# Patient Record
Sex: MALE | Race: BLACK OR AFRICAN AMERICAN | NOT HISPANIC OR LATINO | Employment: STUDENT | URBAN - METROPOLITAN AREA
[De-identification: names, ages, dates, MRNs, and addresses within clinical notes are randomized per-mention and may not be internally consistent; named-entity substitution may affect disease eponyms.]

---
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
## 2019-05-01 ENCOUNTER — OFFICE VISIT (OUTPATIENT)
Dept: CARDIOLOGY | Facility: CLINIC | Age: 19
End: 2019-05-01
Payer: MEDICAID

## 2019-05-01 VITALS
DIASTOLIC BLOOD PRESSURE: 80 MMHG | OXYGEN SATURATION: 98 % | WEIGHT: 169 LBS | HEART RATE: 63 BPM | SYSTOLIC BLOOD PRESSURE: 126 MMHG | HEIGHT: 74 IN | BODY MASS INDEX: 21.69 KG/M2

## 2019-05-01 DIAGNOSIS — Z98.890 PERSONAL HISTORY OF SURGERY TO HEART AND GREAT VESSELS, PRESENTING HAZARDS TO HEALTH: ICD-10-CM

## 2019-05-01 DIAGNOSIS — J45.40 MODERATE PERSISTENT ASTHMA WITHOUT COMPLICATION: ICD-10-CM

## 2019-05-01 DIAGNOSIS — Q24.5 ANOMALOUS ORIGIN OF RIGHT CORONARY ARTERY: Primary | ICD-10-CM

## 2019-05-01 PROCEDURE — 99999 PR PBB SHADOW E&M-EST. PATIENT-LVL III: ICD-10-PCS | Mod: PBBFAC,,, | Performed by: INTERNAL MEDICINE

## 2019-05-01 PROCEDURE — 99213 OFFICE O/P EST LOW 20 MIN: CPT | Mod: PBBFAC,PN | Performed by: INTERNAL MEDICINE

## 2019-05-01 PROCEDURE — 99999 PR PBB SHADOW E&M-EST. PATIENT-LVL III: CPT | Mod: PBBFAC,,, | Performed by: INTERNAL MEDICINE

## 2019-05-01 PROCEDURE — 99203 OFFICE O/P NEW LOW 30 MIN: CPT | Mod: S$PBB,,, | Performed by: INTERNAL MEDICINE

## 2019-05-01 PROCEDURE — 99203 PR OFFICE/OUTPT VISIT, NEW, LEVL III, 30-44 MIN: ICD-10-PCS | Mod: S$PBB,,, | Performed by: INTERNAL MEDICINE

## 2019-05-01 NOTE — PROGRESS NOTES
[facility]      Chief Complaint   Patient presents with    [facility]       Patient ID: Savage Baez Jr. is a 18 y.o. male with a past medical history of anomalous right coronary artery arising from left sinus s/p RCA unroofing and then re-connection of the repaired vessel to the aorta on 12/19/2017, asthma, who presents for an initial appointment.  Pertinent history/events are as follows:     -Pt followed by Dr. Sage of Pediatric Cardiology.    HPI:  Ms. Baez reports no chest pain, SOB, LE edema, palpitations, TIA symptoms or syncope.  Echo from 1/2018 as below.  EKG from 1/8/2018 shows normal sinus rhythm; right atrial enlargement; nonspecific T wave abnormality.    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    Asthma      Past Surgical History:   Procedure Laterality Date    anomalous coronary artery repair Right 12/19/2017    REPAIR-CORONARY ARTERY ANOMALIES N/A 12/19/2017    Performed by Jalen Masetrson MD at [facility] OR [address] [place] [address]     Social History     Socioeconomic History    Marital status: Single     Spouse name: Not on file    Number of children: Not on file    Years of education: Not on file    Highest education level: Not on file   Occupational History    Not on file   Social Needs    Financial resource strain: Not on file    Food insecurity:     Worry: Not on file     Inability: Not on file    Transportation needs:     Medical: Not on file     Non-medical: Not on file   Tobacco Use    Smoking status: Never Smoker    Smokeless tobacco: Never Used   Substance and Sexual Activity    Alcohol use: Not on file    Drug use: Not on file    Sexual activity: Not on file   Lifestyle    Physical activity:     Days per week: Not on file     Minutes per session: Not on file    Stress: Not on file   Relationships    Social connections:     Talks on phone: Not on file      Gets together: Not on file     Attends Scientologist service: Not on file     Active member of club or organization: Not on file     Attends meetings of clubs or organizations: Not on file     Relationship status: Not on file   Other Topics Concern    Not on file   Social History Narrative    Lives with mother and twin sister. 2 dogs, ( 1 inside/1 outside).  In 12 th grade [email or web address]     Family History   Problem Relation Age of Onset    No Known Problems Mother     No Known Problems Father     No Known Problems Sister     No Known Problems Brother     Asthma Sister     No Known Problems Brother        Review of patient's allergies indicates:  No Known Allergies    Medication List with Changes/Refills   Current Medications    ALBUTEROL (ACCUNEB) 1.25 MG/3 ML NEBU    Take 1.25 mg by nebulization every 6 (six) hours as needed. Rescue    BECLOMETHASONE (QVAR) 80 MCG/ACTUATION AERO    Inhale 1 puff into the lungs 2 (two) times daily. Controller    LORATADINE (CLARITIN) 10 MG TABLET    Take 10 mg by mouth once daily.   Discontinued Medications    CLOPIDOGREL (PLAVIX) 75 MG TABLET    Take 1 tablet (75 mg total) by mouth once daily.    FUROSEMIDE (LASIX) 20 MG TABLET    Take 1 tablet (20 mg total) by mouth every 12 (twelve) hours.    IBUPROFEN (ADVIL,MOTRIN) 800 MG TABLET    Take 1 tablet (800 mg total) by mouth every 6 (six) hours as needed for Pain.    NAPROXEN (NAPROSYN) 500 MG TABLET    Take 1 tablet (500 mg total) by mouth every 12 (twelve) hours as needed (pain).    OXYCODONE (ROXICODONE) 10 MG TAB IMMEDIATE RELEASE TABLET    Take 1 tablet (10 mg total) by mouth every 8 (eight) hours as needed.    POLYETHYLENE GLYCOL (GLYCOLAX) 17 GRAM PWPK    Take 17 g by mouth once daily.       Review of Systems  Constitution: Denies chills, fever, and sweats.  HENT: Denies headaches or blurry vision.  Cardiovascular: Denies chest pain or irregular heart beat.  Respiratory: Denies cough or shortness of  "breath.  Gastrointestinal: Denies abdominal pain, nausea, or vomiting.  Musculoskeletal: Denies muscle cramps.  Neurological: Denies dizziness or focal weakness.  Psychiatric/Behavioral: Normal mental status.  Hematologic/Lymphatic: Denies bleeding problem or easy bruising/bleeding.  Skin: Denies rash or suspicious lesions    Physical Examination  /80 (BP Location: Left arm, Patient Position: Sitting, BP Method: Medium (Automatic))   Pulse 63   Ht 6' 2" (1.88 m)   Wt 76.7 kg (169 lb)   SpO2 98%   BMI 21.70 kg/m²     Constitutional: No acute distress, conversant  HEENT: Sclera anicteric, Pupils equal, round and reactive to light, extraocular motions intact, Oropharynx clear  Neck: No JVD, no carotid bruits  Cardiovascular: regular rate and rhythm, no murmur, rubs or gallops, normal S1/S2  Pulmonary: Clear to auscultation bilaterally  Abdominal: Abdomen soft, nontender, nondistended, positive bowel sounds  Extremities: No lower extremity edema,   Pulses:  Carotid pulses are 2+ on the right side, and 2+ on the left side.  Radial pulses are 2+ on the right side, and 2+ on the left side.   Femoral pulses are 2+ on the right side, and 2+ on the left side.  Popliteal pulses are 2+ on the right side, and 2+ on the left side.   Dorsalis pedis pulses are 2+ on the right side, and 2+ on the left side.   Posterior tibial pulses are 2+ on the right side, and 2+ on the left side.    Skin: No ecchymosis, erythema, or ulcers  Psych: Alert and oriented x 3, appropriate affect  Neuro: CNII-XII intact, no focal deficits    Labs:  Most Recent Data  CBC:   Lab Results   Component Value Date    WBC 7.50 01/08/2018    HGB 13.7 (L) 01/08/2018    HCT 40.0 01/08/2018     (H) 01/08/2018    MCV 82 01/08/2018    RDW 12.2 01/08/2018     BMP:   Lab Results   Component Value Date     12/22/2017    K 3.8 12/22/2017     12/22/2017    CO2 30 (H) 12/22/2017    BUN 10 12/22/2017    CREATININE 1.0 12/22/2017    GLU 91 " 12/22/2017    CALCIUM 9.1 12/22/2017    MG 1.9 12/22/2017    PHOS 3.3 12/22/2017     LFTS;   Lab Results   Component Value Date    PROT 6.3 12/22/2017    ALBUMIN 3.3 12/22/2017    BILITOT 0.5 12/22/2017    AST 23 12/22/2017    ALKPHOS 89 12/22/2017    ALT 10 12/22/2017     COAGS:   Lab Results   Component Value Date    INR 1.2 12/21/2017     FLP: No results found for: CHOL, HDL, LDLCALC, TRIG, CHOLHDL  CARDIAC: No results found for: TROPONINI, CKMB, BNP      EKG 1/8/2019:  Normal sinus rhythm  Right atrial enlargement  Nonspecific T wave abnormality    Echo 1/2018:  ECHO:  No pericardial effusion. There are 4 cardiac chambers.  No clots or vegetations.  No ASD or VSD.  Cardiac valves appear normal.  No Ao arch obstruction. No RVOT or LVOT obstruction.  M-mode measurements reveal: LV 4.9 cm and RV 2.2 cm. (both are normal). Ao root 3.2 cm.  LA 3.4 cm.  Sep 0.9 cm. PW 1.0 cm.  EF 70%.  The LCA appears to arise normally off the L posterior cusp of the Ao.  It is not enlarged. Normal branching into the LAD and Circ. The surgically constructed RCA to Ao connection appears patent.  Doppler shows mild TR with peak pressure drop of 20-30 mmHg (speaks against PHTN). Mild PI.  Peak pressure drop across the PV is 5 mmHg.  No MR and no AI. Peak pressure drop across the Ao valve is 5 mmHg.  PVs to the LA.      Assessment/Plan:  Savage Baez Jr. is a 18 y.o. male with a past medical history of anomalous right coronary artery arising from left sinus s/p RCA unroofing and then re-connection of the repaired vessel to the aorta on 12/19/2017, asthma, who presents for an initial appointment.    1. 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     Refer to Congenital Clinic    Total duration of face to face visit time 30 minutes.  Total time spent counseling greater than fifty percent of  total visit time.  Counseling included discussion regarding imaging findings, diagnosis, possibilities, treatment options, risks and benefits.  The patient had many questions regarding the options and long-term effects.    Felton Simms MD, PhD  Interventional Cardiology

## 2019-05-01 NOTE — PATIENT INSTRUCTIONS
Assessment/Plan:  Savage NOLASCO Nestor Morales is a 18 y.o. male with a past medical history of anomalous right coronary artery arising from left sinus s/p RCA unroofing and then re-connection of the repaired vessel to the aorta on 12/19/2017, asthma, who presents for an initial appointment.    1. 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     Refer to Congenital Clinic

## 2019-05-01 NOTE — LETTER
May 1, 2019      Tristian Sage MD  [address]  [address]  [facility] [address]           [place] - Cardiology  [address]  [place]   [facility] [address]  Phone: [phone]  Fax: [phone]          Patient: Savage Baez Jr.   MR Number: [id number]   YOB: 2000   Date of Visit: 5/1/2019       Dear Dr. Tristian Sage:    Thank you for referring Savage Baez to me for evaluation. Attached you will find relevant portions of my assessment and plan of care.    If you have questions, please do not hesitate to call me. I look forward to following Savage Baez along with you.    Sincerely,    Felton Simms MD PhD    Enclosure  CC:  No Recipients    If you would like to receive this communication electronically, please contact [email or web address] or [phone] to request more information on [employer] access.    For providers and/or their staff who would like to refer a patient to [facility], please contact us through our one-stop-shop provider referral line, [facility], at [phone].    If you feel you have received this communication in error or would no longer like to receive these types of communications, please e-mail [email or web address]

## 2019-05-06 ENCOUNTER — TELEPHONE (OUTPATIENT)
Dept: CARDIOLOGY | Facility: CLINIC | Age: 19
End: 2019-05-06

## 2019-05-06 NOTE — TELEPHONE ENCOUNTER
Ms Baez came into the office today, inquiring about having medical records and physical to faxed for Mr Baez to cocone.

## 2019-05-06 NOTE — TELEPHONE ENCOUNTER
----- Message from Adam Hoover sent at 5/6/2019  3:24 PM CDT -----  Contact: FRANCISCA- MOTHER  PATIENT WOULD LIKE TO START NEW JOB AND HAS FORMS THAT NEED TO BE COMPLETED. PLEASE CALL TO DIRECT WHETHER IT IS BEST TO FAX, EMAIL OR MAKE AN APPT

## 2019-05-06 NOTE — TELEPHONE ENCOUNTER
I advised Ms Baez to consult with Cardiac surgery because they are seeking information from 12/2017 and we only saw Mr Baez for the first time last week. Mother agreed!

## 2019-05-07 ENCOUNTER — CLINICAL SUPPORT (OUTPATIENT)
Dept: CARDIOLOGY | Facility: CLINIC | Age: 19
End: 2019-05-07
Payer: MEDICAID

## 2019-05-07 ENCOUNTER — OFFICE VISIT (OUTPATIENT)
Dept: CARDIOLOGY | Facility: CLINIC | Age: 19
End: 2019-05-07
Payer: MEDICAID

## 2019-05-07 VITALS
HEIGHT: 76 IN | HEART RATE: 67 BPM | OXYGEN SATURATION: 98 % | WEIGHT: 171.75 LBS | DIASTOLIC BLOOD PRESSURE: 69 MMHG | BODY MASS INDEX: 20.91 KG/M2 | SYSTOLIC BLOOD PRESSURE: 123 MMHG

## 2019-05-07 DIAGNOSIS — Q24.5 ANOMALOUS ORIGIN OF RIGHT CORONARY ARTERY: ICD-10-CM

## 2019-05-07 DIAGNOSIS — Z98.890 PERSONAL HISTORY OF SURGERY TO HEART AND GREAT VESSELS, PRESENTING HAZARDS TO HEALTH: ICD-10-CM

## 2019-05-07 DIAGNOSIS — Q24.5 ANOMALOUS ORIGIN OF RIGHT CORONARY ARTERY: Primary | ICD-10-CM

## 2019-05-07 PROCEDURE — 93325 DOPPLER ECHO COLOR FLOW MAPG: CPT | Mod: S$GLB,,, | Performed by: PEDIATRICS

## 2019-05-07 PROCEDURE — 99214 PR OFFICE/OUTPT VISIT, EST, LEVL IV, 30-39 MIN: ICD-10-PCS | Mod: 25,S$GLB,, | Performed by: PEDIATRICS

## 2019-05-07 PROCEDURE — 93303 ECHO TRANSTHORACIC: CPT | Mod: S$GLB,,, | Performed by: PEDIATRICS

## 2019-05-07 PROCEDURE — 93320 DOPPLER ECHO COMPLETE: CPT | Mod: S$GLB,,, | Performed by: PEDIATRICS

## 2019-05-07 PROCEDURE — 99214 OFFICE O/P EST MOD 30 MIN: CPT | Mod: 25,S$GLB,, | Performed by: PEDIATRICS

## 2019-05-07 PROCEDURE — 93000 ELECTROCARDIOGRAM COMPLETE: CPT | Mod: 59,S$GLB,, | Performed by: PEDIATRICS

## 2019-05-07 PROCEDURE — 93303 PR ECHO XTHORACIC,CONG A2M,COMPLETE: ICD-10-PCS | Mod: S$GLB,,, | Performed by: PEDIATRICS

## 2019-05-07 PROCEDURE — 93320 PR DOPPLER ECHO HEART,COMPLETE: ICD-10-PCS | Mod: S$GLB,,, | Performed by: PEDIATRICS

## 2019-05-07 PROCEDURE — 93000 PR ELECTROCARDIOGRAM, COMPLETE: ICD-10-PCS | Mod: 59,S$GLB,, | Performed by: PEDIATRICS

## 2019-05-07 PROCEDURE — 93325 PR DOPPLER COLOR FLOW VELOCITY MAP: ICD-10-PCS | Mod: S$GLB,,, | Performed by: PEDIATRICS

## 2019-05-07 NOTE — PROGRESS NOTES
"    TO WHOM IT CONCERNS:      We had the pleasure to see, Savage Baez, in the cardiology clinic at [facility] on May 7th, 2019.. This is a clinic for pediatric and adult patients with congenital heart disease.     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  He underwent surgery for this condition on 12/19/2017. 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 The POSTOPERATIVE diagnosis wasthe same.  The procedure entailed  "RCA unroofing and then re-connection of the repaired vessel to the aorta". The patient required vent support for only 1 day.  According to the mom, recovery was rather uneventful.  Savage reports he is doing well.  Currently, he appears to be doing well. No  lightheadedness or syncope, SOB, difficulty breathing, CP, palpitations, abnormally slow or rapid HRs, or inability to keep up with activities.  He is with his mother.        ROS:  No problems with vision or hearing. No swallowing disorder or NIKOLAY. No wheezing.  No asthma.  No thyroid disease or DM. No SS disease.  No abdominal pain and normal bowel movements.  No pelvic pain and normal urination.  No known intrinsic problem with the lungs, liver of kidneys. No arterial disease or lipid disorder.No joint pain, swelling or edema. No rashes. No neuromuscular disorder.        PE:  Healthy looking 18 year old in no distress.   WT 77.9 kg. (172 lbs).   HT 1.92 m (6' 4 '').  HR 62 bpm and regular.  Sats in RA 98%.  /69 mmHg in the RA.  HEENT: Normal eye movements. No bruits over the head and neck.  No JVD. Mucus membranes are moist and pink. Neck is supple. No thyroid enlargement.  CHEST: Well healed mid-line scar. Normal chest movement.Good air entry bilaterally.  Clear BSs.  No wheezing, rhonchi or rales.  HEART:  Normal precordial activity. S1 and S2 are normal.  A soft 1/6 RICKEY up the " LSB. No DM. No gallop, rub or clicks.  ABD:  Normal BSs. Liver is at the RCM and non-tender. No masses. EXTS:  Normal ROM. Pulses are 2+ throughout. Normal capillary refill.  No edema, clubbing or cyanosis.     ...............................................................................................................................       ECG: Sinus rhythm at ~ 60 bpm. QRS 88 ms.  Normal ventricular voltage.  Minor ST-T abnormality.  Normal QTc at 414 ms.  No abnormal q waves.  No conduction abnormality.          ECHO:  No pericardial effusion. There are 4 cardiac chambers.  No clots or vegetations.  No ASD or VSD.  Cardiac valves appear normal.  No Ao arch obstruction. No RVOT or LVOT obstruction.  M-mode measurements reveal: LV 4.9 cm and RV 2.5 cm. (both are normal). Ao root 3.2 cm.  LA 3.7 cm.  Sep 1.0 cm. PW 0.9 cm.  EF 64%.  The LCA appears to arise normally off the L posterior cusp of the Ao.  It is not enlarged. Normal branching into the LAD and Circ. The surgically constructed RCA to Ao connection appears patent.  Doppler shows mild TR with peak pressure drop of 26 mmHg (speaks against PHTN). Mild PI.  Peak pressure drop across the PV is 4 mmHg.  No MR and no AI. Peak pressure drop across the Ao valve is 6 mmHg.  PVs to the LA.         ........................................................................................................................        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  He appears to have a good hemodynamic result.  Clinically he is doing well. No restriction on physical activity.   May work full time.        PLAN:  1) Antibiotics for any invasive procedure.  2) RTC in one year. If there are any questions, please feel free to contact us.  Thank you for allowing us to help with this patient.    Sincerely,     Tristian Sage PhD, MD. (Qejd [phone]).

## 2019-05-17 ENCOUNTER — TELEPHONE (OUTPATIENT)
Dept: PEDIATRIC CARDIOLOGY | Facility: CLINIC | Age: 19
End: 2019-05-17

## 2019-05-17 NOTE — TELEPHONE ENCOUNTER
Attempted to call patient to inform that he is scheduled with incorrect provider on 5/23. No answer, voicemail not set up

## 2019-05-20 ENCOUNTER — TELEPHONE (OUTPATIENT)
Dept: PEDIATRIC CARDIOLOGY | Facility: CLINIC | Age: 19
End: 2019-05-20

## 2019-05-20 DIAGNOSIS — Z98.890 PERSONAL HISTORY OF SURGERY TO HEART AND GREAT VESSELS, PRESENTING HAZARDS TO HEALTH: ICD-10-CM

## 2019-05-20 DIAGNOSIS — Q24.5 ANOMALOUS ORIGIN OF RIGHT CORONARY ARTERY: Primary | ICD-10-CM

## 2019-05-20 NOTE — TELEPHONE ENCOUNTER
Attempted to contact jessica regarding appointment on 5/23 - No answer, unable to leave message on [phone]    Mother's number - [phone] - Busy

## 2019-05-22 ENCOUNTER — TELEPHONE (OUTPATIENT)
Dept: PEDIATRIC CARDIOLOGY | Facility: CLINIC | Age: 19
End: 2019-05-22

## 2019-05-22 NOTE — TELEPHONE ENCOUNTER
Attempted to call patient regarding appointment with Dr. Khan tomorrow. Will cancel appointment with Dr. Khan as patient has been rescheduled with Dr. Perry.

## 2019-05-23 ENCOUNTER — TELEPHONE (OUTPATIENT)
Dept: PEDIATRIC CARDIOLOGY | Facility: CLINIC | Age: 19
End: 2019-05-23

## 2019-05-23 NOTE — TELEPHONE ENCOUNTER
Attempt to contact this patient unable to leave voicemail. He has seen 2 cardiologist this month.

## 2019-12-07 ENCOUNTER — HOSPITAL ENCOUNTER (EMERGENCY)
Facility: HOSPITAL | Age: 19
Discharge: HOME | End: 2019-12-07
Attending: FAMILY MEDICINE
Payer: MEDICAID

## 2019-12-07 VITALS
HEART RATE: 80 BPM | BODY MASS INDEX: 21.05 KG/M2 | RESPIRATION RATE: 20 BRPM | DIASTOLIC BLOOD PRESSURE: 75 MMHG | HEIGHT: 74 IN | OXYGEN SATURATION: 96 % | TEMPERATURE: 98 F | WEIGHT: 164 LBS | SYSTOLIC BLOOD PRESSURE: 127 MMHG

## 2019-12-07 DIAGNOSIS — J06.9 VIRAL URI WITH COUGH: ICD-10-CM

## 2019-12-07 DIAGNOSIS — B34.9 ACUTE VIRAL SYNDROME: Primary | ICD-10-CM

## 2019-12-07 LAB
INFLUENZA A, MOLECULAR: NEGATIVE
INFLUENZA B, MOLECULAR: NEGATIVE
SPECIMEN SOURCE: NORMAL

## 2019-12-07 PROCEDURE — 25000242 PHARM REV CODE 250 ALT 637 W/ HCPCS: Mod: ER | Performed by: FAMILY MEDICINE

## 2019-12-07 PROCEDURE — 63600175 PHARM REV CODE 636 W HCPCS: Mod: ER | Performed by: FAMILY MEDICINE

## 2019-12-07 PROCEDURE — 99284 EMERGENCY DEPT VISIT MOD MDM: CPT | Mod: 25,ER

## 2019-12-07 PROCEDURE — 87502 INFLUENZA DNA AMP PROBE: CPT | Mod: ER

## 2019-12-07 PROCEDURE — 94640 AIRWAY INHALATION TREATMENT: CPT | Mod: ER

## 2019-12-07 RX ORDER — PREDNISONE 20 MG/1
40 TABLET ORAL DAILY
Qty: 10 TABLET | Refills: 0 | Status: SHIPPED | OUTPATIENT
Start: 2019-12-07 | End: 2019-12-12

## 2019-12-07 RX ORDER — ALBUTEROL SULFATE 90 UG/1
1-2 AEROSOL, METERED RESPIRATORY (INHALATION) EVERY 6 HOURS PRN
Qty: 1 INHALER | Refills: 0 | Status: SHIPPED | OUTPATIENT
Start: 2019-12-07 | End: 2020-12-06

## 2019-12-07 RX ORDER — ALBUTEROL SULFATE 90 UG/1
1-2 AEROSOL, METERED RESPIRATORY (INHALATION) EVERY 6 HOURS PRN
Qty: 1 INHALER | Refills: 1 | Status: SHIPPED | OUTPATIENT
Start: 2019-12-07 | End: 2020-12-06

## 2019-12-07 RX ORDER — PREDNISONE 20 MG/1
60 TABLET ORAL
Status: COMPLETED | OUTPATIENT
Start: 2019-12-07 | End: 2019-12-07

## 2019-12-07 RX ORDER — IPRATROPIUM BROMIDE AND ALBUTEROL SULFATE 2.5; .5 MG/3ML; MG/3ML
3 SOLUTION RESPIRATORY (INHALATION) ONCE
Status: COMPLETED | OUTPATIENT
Start: 2019-12-07 | End: 2019-12-07

## 2019-12-07 RX ADMIN — PREDNISONE 60 MG: 20 TABLET ORAL at 06:12

## 2019-12-07 RX ADMIN — IPRATROPIUM BROMIDE AND ALBUTEROL SULFATE 3 ML: .5; 3 SOLUTION RESPIRATORY (INHALATION) at 06:12

## 2019-12-07 NOTE — ED PROVIDER NOTES
Encounter Date: 12/7/2019       History     Chief Complaint   Patient presents with    Influenza     Pt c/o flu like symptoms X 3 days.      19-year-old male presents with chief complaint of flu-like symptoms for the last 3 days.  Patient denies any fever but does report body aches and chills. Did wake up this morning covered in sweat.  Patient decided to presents emergency room for evaluation.  Does have a history of asthma.  Patient reports some mild chest ice chest tightness at present.  Reports the last time he took a breathing treatment was on yesterday.        Review of patient's allergies indicates:  No Known Allergies  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    Asthma      Past Surgical History:   Procedure Laterality Date    anomalous coronary artery repair Right 12/19/2017     Family History   Problem Relation Age of Onset    No Known Problems Mother     No Known Problems Father     No Known Problems Sister     No Known Problems Brother     Asthma Sister     No Known Problems Brother      Social History     Tobacco Use    Smoking status: Never Smoker    Smokeless tobacco: Never Used   Substance Use Topics    Alcohol use: Not on file    Drug use: Not on file     Review of Systems   Constitutional: Positive for chills. Negative for fever.   Respiratory: Positive for shortness of breath and wheezing. Negative for chest tightness.    Musculoskeletal: Positive for myalgias.   All other systems reviewed and are negative.      Physical Exam     Initial Vitals [12/07/19 0542]   BP Pulse Resp Temp SpO2   127/75 75 19 98.3 °F (36.8 °C) --      MAP       --         Physical Exam    Nursing note and vitals reviewed.  Constitutional: He appears well-developed and well-nourished.   HENT:   Head: Normocephalic and atraumatic.   Eyes: EOM are normal. Pupils are equal, round, and reactive to light.    Neck: Normal range of motion. Neck supple.   Cardiovascular: Normal rate, regular rhythm and normal heart sounds.   Pulmonary/Chest: He has wheezes.   Abdominal: Soft. Bowel sounds are normal.   Musculoskeletal: Normal range of motion.   Neurological: He is alert and oriented to person, place, and time. He has normal strength. GCS score is 15. GCS eye subscore is 4. GCS verbal subscore is 5. GCS motor subscore is 6.   Skin: Skin is warm. Capillary refill takes less than 2 seconds.   Psychiatric: He has a normal mood and affect. His behavior is normal.         ED Course   Procedures  Labs Reviewed   INFLUENZA A & B BY MOLECULAR          Imaging Results    None                                          Clinical Impression:       ICD-10-CM ICD-9-CM   1. Acute viral syndrome B34.9 079.99   2. Viral URI with cough J06.9 465.9    B97.89                              Gonzalez Real MD  12/14/19 [id number]

## 2019-12-08 ENCOUNTER — HOSPITAL ENCOUNTER (EMERGENCY)
Facility: HOSPITAL | Age: 19
Discharge: HOME | End: 2019-12-08
Attending: EMERGENCY MEDICINE
Payer: MEDICAID

## 2019-12-08 VITALS
BODY MASS INDEX: 20.79 KG/M2 | HEIGHT: 74 IN | OXYGEN SATURATION: 97 % | WEIGHT: 162 LBS | RESPIRATION RATE: 16 BRPM | DIASTOLIC BLOOD PRESSURE: 71 MMHG | TEMPERATURE: 98 F | SYSTOLIC BLOOD PRESSURE: 127 MMHG | HEART RATE: 61 BPM

## 2019-12-08 DIAGNOSIS — B34.9 VIRAL SYNDROME: Primary | ICD-10-CM

## 2019-12-08 DIAGNOSIS — R07.9 CHEST PAIN: ICD-10-CM

## 2019-12-08 LAB
ALBUMIN SERPL BCP-MCNC: 3.9 G/DL (ref 3.5–5.2)
ALP SERPL-CCNC: 93 U/L (ref 55–135)
ALT SERPL W/O P-5'-P-CCNC: 9 U/L (ref 10–44)
ANION GAP SERPL CALC-SCNC: 9 MMOL/L (ref 8–16)
AST SERPL-CCNC: 16 U/L (ref 10–40)
BASOPHILS # BLD AUTO: 0.02 K/UL (ref 0–0.2)
BASOPHILS NFR BLD: 0.3 % (ref 0–1.9)
BILIRUB SERPL-MCNC: 0.5 MG/DL (ref 0.1–1)
BNP SERPL-MCNC: 19 PG/ML (ref 0–99)
BUN SERPL-MCNC: 17 MG/DL (ref 6–20)
CALCIUM SERPL-MCNC: 8.9 MG/DL (ref 8.7–10.5)
CHLORIDE SERPL-SCNC: 106 MMOL/L (ref 95–110)
CO2 SERPL-SCNC: 25 MMOL/L (ref 23–29)
CREAT SERPL-MCNC: 1 MG/DL (ref 0.5–1.4)
CRP SERPL-MCNC: 24.5 MG/L (ref 0–8.2)
DIFFERENTIAL METHOD: NORMAL
EOSINOPHIL # BLD AUTO: 0.3 K/UL (ref 0–0.5)
EOSINOPHIL NFR BLD: 3.7 % (ref 0–8)
ERYTHROCYTE [DISTWIDTH] IN BLOOD BY AUTOMATED COUNT: 12.5 % (ref 11.5–14.5)
ERYTHROCYTE [SEDIMENTATION RATE] IN BLOOD BY WESTERGREN METHOD: 5 MM/HR (ref 0–23)
EST. GFR  (AFRICAN AMERICAN): >60 ML/MIN/1.73 M^2
EST. GFR  (NON AFRICAN AMERICAN): >60 ML/MIN/1.73 M^2
GLUCOSE SERPL-MCNC: 87 MG/DL (ref 70–110)
HCT VFR BLD AUTO: 51.1 % (ref 40–54)
HGB BLD-MCNC: 17 G/DL (ref 14–18)
IMM GRANULOCYTES # BLD AUTO: 0.02 K/UL (ref 0–0.04)
IMM GRANULOCYTES NFR BLD AUTO: 0.3 % (ref 0–0.5)
LYMPHOCYTES # BLD AUTO: 1.3 K/UL (ref 1–4.8)
LYMPHOCYTES NFR BLD: 18.1 % (ref 18–48)
MCH RBC QN AUTO: 29 PG (ref 27–31)
MCHC RBC AUTO-ENTMCNC: 33.3 G/DL (ref 32–36)
MCV RBC AUTO: 87 FL (ref 82–98)
MONOCYTES # BLD AUTO: 0.8 K/UL (ref 0.3–1)
MONOCYTES NFR BLD: 11 % (ref 4–15)
NEUTROPHILS # BLD AUTO: 4.8 K/UL (ref 1.8–7.7)
NEUTROPHILS NFR BLD: 66.6 % (ref 38–73)
NRBC BLD-RTO: 0 /100 WBC
PLATELET # BLD AUTO: 304 K/UL (ref 150–350)
PMV BLD AUTO: 10.6 FL (ref 9.2–12.9)
POTASSIUM SERPL-SCNC: 3.5 MMOL/L (ref 3.5–5.1)
PROT SERPL-MCNC: 7.2 G/DL (ref 6–8.4)
RBC # BLD AUTO: 5.87 M/UL (ref 4.6–6.2)
SODIUM SERPL-SCNC: 140 MMOL/L (ref 136–145)
TROPONIN I SERPL DL<=0.01 NG/ML-MCNC: <0.006 NG/ML (ref 0–0.03)
WBC # BLD AUTO: 7.24 K/UL (ref 3.9–12.7)

## 2019-12-08 PROCEDURE — 93010 EKG 12-LEAD: ICD-10-PCS | Mod: ,,, | Performed by: INTERNAL MEDICINE

## 2019-12-08 PROCEDURE — 99285 PR EMERGENCY DEPT VISIT,LEVEL V: ICD-10-PCS | Mod: ,,, | Performed by: PHYSICIAN ASSISTANT

## 2019-12-08 PROCEDURE — 36000 PLACE NEEDLE IN VEIN: CPT

## 2019-12-08 PROCEDURE — 93005 ELECTROCARDIOGRAM TRACING: CPT

## 2019-12-08 PROCEDURE — 85652 RBC SED RATE AUTOMATED: CPT

## 2019-12-08 PROCEDURE — 25000003 PHARM REV CODE 250: Performed by: PHYSICIAN ASSISTANT

## 2019-12-08 PROCEDURE — 84484 ASSAY OF TROPONIN QUANT: CPT

## 2019-12-08 PROCEDURE — 83880 ASSAY OF NATRIURETIC PEPTIDE: CPT

## 2019-12-08 PROCEDURE — 86140 C-REACTIVE PROTEIN: CPT

## 2019-12-08 PROCEDURE — 99285 EMERGENCY DEPT VISIT HI MDM: CPT | Mod: 25

## 2019-12-08 PROCEDURE — 80053 COMPREHEN METABOLIC PANEL: CPT

## 2019-12-08 PROCEDURE — 93010 ELECTROCARDIOGRAM REPORT: CPT | Mod: ,,, | Performed by: INTERNAL MEDICINE

## 2019-12-08 PROCEDURE — 85025 COMPLETE CBC W/AUTO DIFF WBC: CPT

## 2019-12-08 PROCEDURE — 99285 EMERGENCY DEPT VISIT HI MDM: CPT | Mod: ,,, | Performed by: PHYSICIAN ASSISTANT

## 2019-12-08 RX ORDER — ACETAMINOPHEN 500 MG
1000 TABLET ORAL
Status: COMPLETED | OUTPATIENT
Start: 2019-12-08 | End: 2019-12-08

## 2019-12-08 RX ADMIN — ACETAMINOPHEN 1000 MG: 500 TABLET ORAL at 08:12

## 2019-12-08 NOTE — DISCHARGE INSTRUCTIONS
Take 1000mg tylenol every 8 hours for pain. Take 200mg ibuprofen every 8 hours for pain. Continue use inhaler as needed.  Follow up with your primary provider this week.  Return emergency department if you develop worsening chest pain, shortness of breath or dizziness.    Our goal in the emergency department is to always give you outstanding care and exceptional service. You may receive a survey by mail or e-mail in the next week regarding your experience in our ED. We would greatly appreciate your completing and returning the survey. Your feedback provides us with a way to recognize our staff who give very good care and it helps us learn how to improve when your experience was below our aspiration of excellence.

## 2019-12-08 NOTE — EKG INTERPRETATIONS - EMERGENCY DEPT.
Independently interpreted by MD:  Rate 58, NSR, no stemi, no ectopy, ELISABETH, poor qrs progression in V3-4

## 2019-12-08 NOTE — ED TRIAGE NOTES
Patient presents to the ED with midsternal chest pain. States it does not radiate anywhere and is localized to the midsternal chest. Pt reports a productive cough x2 days with fevers at home.

## 2019-12-08 NOTE — ED PROVIDER NOTES
Encounter Date: 12/8/2019       History     Chief Complaint   Patient presents with    Chest Pain     had surg on aorta in past,     Mr Baez is a 19yoM returns for substernal chest pain since yesterday; pertinent PMHx h/o anomalous coronary artery repair, asthma.  Patient was seen yesterday for flu-like symptoms, flu swab negative, treated with conservative management.  He reports onset of central substernal chest pain, constant since last night, worsens with deep inspiration and cough.  Denies chest pressure or radiation of pain. Not associated with shortness of breath, abdominal pain, nausea, lightheadedness.  No antipyretic taken today.Patient has not taken anything for pain.  The patients available PMH, PSH, Social History, medications, allergies, and triage vital signs were reviewed just prior to their medical evaluation.    A ten point review of systems was completed and is negative except as documented above.  Patient denies any other acute medical complaint.    Please be advised this text was dictated with [employer] software and may contain errors due to translation.           Review of patient's allergies indicates:  No Known Allergies  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    Asthma      Past Surgical History:   Procedure Laterality Date    anomalous coronary artery repair Right 12/19/2017     Family History   Problem Relation Age of Onset    No Known Problems Mother     No Known Problems Father     No Known Problems Sister     No Known Problems Brother     Asthma Sister     No Known Problems Brother      Social History     Tobacco Use    Smoking status: Never Smoker    Smokeless tobacco: Never Used   Substance Use Topics    Alcohol use: Not on file    Drug use: Not on file     Review of Systems   Constitutional: Positive for chills and fever.   HENT: Negative for sore throat and  trouble swallowing.    Respiratory: Positive for cough. Negative for chest tightness, shortness of breath, wheezing and stridor.    Cardiovascular: Positive for chest pain.   Gastrointestinal: Negative for abdominal pain, nausea and vomiting.   Musculoskeletal: Negative for back pain and neck pain.   Skin: Negative for pallor and rash.   Neurological: Negative for dizziness, weakness, light-headedness and headaches.   Psychiatric/Behavioral: Negative for confusion.       Physical Exam     Initial Vitals [12/08/19 0713]   BP Pulse Resp Temp SpO2   (!) 140/93 64 18 98.3 °F (36.8 °C) 96 %      MAP       --         Physical Exam    Vitals reviewed.  Constitutional: He appears well-developed and well-nourished. He is not diaphoretic.  Non-toxic appearance. He does not appear ill. No distress.   HENT:   Head: Normocephalic and atraumatic.   Eyes: EOM are normal. Pupils are equal, round, and reactive to light.   Neck: Normal range of motion. Neck supple. No JVD present.   Cardiovascular: Normal rate and regular rhythm. Exam reveals no distant heart sounds and no friction rub.    Pain does not change with position   Pulmonary/Chest: Effort normal and breath sounds normal. No accessory muscle usage or stridor. No tachypnea. No respiratory distress. He has no decreased breath sounds. He has no wheezes. He has no rhonchi. He has no rales.   Abdominal: Soft. Bowel sounds are normal. There is no tenderness.   Musculoskeletal:        Right lower leg: He exhibits no edema.        Left lower leg: He exhibits no edema.   Neurological: He is alert and oriented to person, place, and time. He is not disoriented. No cranial nerve deficit.   Skin: Skin is warm and dry. Capillary refill takes less than 2 seconds. No rash noted. No cyanosis or erythema. No pallor. Nails show no clubbing.   Psychiatric: He has a normal mood and affect. His behavior is normal. His mood appears not anxious. He is not agitated.         ED Course    Procedures  Labs Reviewed   COMPREHENSIVE METABOLIC PANEL - Abnormal; Notable for the following components:       Result Value    ALT 9 (*)     All other components within normal limits   C-REACTIVE PROTEIN - Abnormal; Notable for the following components:    CRP 24.5 (*)     All other components within normal limits   CBC W/ AUTO DIFFERENTIAL   TROPONIN I   B-TYPE NATRIURETIC PEPTIDE   SEDIMENTATION RATE          Imaging Results          X-Ray Chest AP Portable (Final result)  Result time 12/08/19 07:43:22    Final result by Lexy Hooper MD (12/08/19 07:43:22)                 Impression:      No acute intrathoracic abnormality identified on this single radiographic view of the chest.      Electronically signed by: Lexy Hooper MD  Date:    12/08/2019  Time:    07:43             Narrative:    EXAMINATION:  XR CHEST AP PORTABLE    CLINICAL HISTORY:  Chest Pain;    TECHNIQUE:  Single frontal view of the chest was performed.    COMPARISON:  01/30/2018    FINDINGS:  Cardiac monitoring leads overlie the chest.  There is postoperative change of prior median sternotomy.  Cardiomediastinal silhouette is within normal limits.  The visualized airway is unremarkable.  The lungs appear symmetrically aerated without definite focal alveolar consolidation. No large pleural effusion or pneumothorax is appreciated.Visualized osseous structures are intact.                                 Medical Decision Making:   History:   Old Medical Records: I decided to obtain old medical records.  Old Records Summarized: records from clinic visits and records from previous admission(s).  Initial Assessment:   Patient with history of anomalous coronary artery repair presents for centralized chest pain x 1 day, worsened with deep inspiration and coughing, not changed by position, diagnosed with viral syndrome yesterday.  VSS, afebrile  Differential Diagnosis:   DDx includes musculoskeletal chest pain/costochondritis, viral syndrome,  pericarditis. Physical exam and history taking lower clinical suspicion for myocarditis, ACS, PNA, aortic dissection, pancreatitis.   Independently Interpreted Test(s):   I have ordered and independently interpreted X-rays - see prior notes.  I have ordered and independently interpreted EKG Reading(s) - see prior notes  Clinical Tests:   Lab Tests: Ordered and Reviewed  Radiological Study: Ordered and Reviewed  Medical Tests: Ordered and Reviewed  ED Management:  Given Tylenol.  Afebrile without antipyretic use today.  EKG without acute ischemic changes, rate atrial enlargement appreciated, normal interval, normal axis.  Labs unremarkable, screening troponin WNL.  Elevation of CRP, the patient currently suffering viral illness.  No Elevation of ESR.  Clinically, do not suspect pericarditis at this time.  Troponin WNL, do not suspect myositis at this time.  Chest x-ray without acute findings.  Discussed these findings with patient, recommended Maxing out at Tylenol dosing at home and low dose NSAID use with PCP follow-up. Patient agreed to plan of care and voiced understanding. Discharged in stable condition with strict ED return precautions.    Amelia Merritt PA-C  12/08/2019    I discussed the following case, diagnosis and plan of care with attending physician.                                   Clinical Impression:       ICD-10-CM ICD-9-CM   1. Viral syndrome B34.9 079.99   2. Chest pain R07.9 786.50         Disposition:   Disposition: Discharged  Condition: Stable                     Amelia Merritt PA-C  12/08/19 1257

## 2019-12-08 NOTE — ED NOTES
Patient identifiers verified and correct for Savage NOLASCO Nestor Morales.    LOC: The patient is awake, alert and aware of environment with an appropriate affect, the patient is oriented x 3 and speaking appropriately.  APPEARANCE: Patient resting comfortably and in no acute distress, patient is clean and well groomed, patient's clothing is properly fastened.  SKIN: The skin is warm and dry, color consistent with ethnicity, patient has normal skin turgor and moist mucus membranes, skin intact, no breakdown or bruising noted.  MUSCULOSKELETAL: Patient moving all extremities spontaneously.  RESPIRATORY: Airway is open and patent, respirations are spontaneous.  CARDIAC: Patient has a normal rate, no periphreal edema noted, capillary refill < 3 seconds.  ABDOMEN: Soft and non tender to palpation.

## 2020-07-24 ENCOUNTER — TELEPHONE (OUTPATIENT)
Dept: PEDIATRIC CARDIOLOGY | Facility: CLINIC | Age: 20
End: 2020-07-24

## 2020-07-24 NOTE — TELEPHONE ENCOUNTER
Left VM asking for call back to discuss appointment needs.  Contact information left for call back.    ----- Message from Pedro Welch sent at 7/24/2020 11:53 AM CDT -----  Regarding: /mother  Ms. Anderson called in to speak with someone at the office regarding scheduling an appointment. She would like a call back from the office and can be reached at    [phone]

## 2020-08-05 NOTE — ED NOTES
Auth: # NPR  Ref # [id number]    Date: 08/10/2020  Type of SX:  OP  Location: [facility]  CPT: 95204  10688   65838.69   13670   DX Code: M54.16   M51.26  M48.062  SX area: Rt  L3  L4  Transforaminal DANNIE  Insurance: [facility] Lung sounds remain diminished with exp wheezing

## 2022-03-15 ENCOUNTER — HOSPITAL ENCOUNTER (EMERGENCY)
Facility: HOSPITAL | Age: 22
Discharge: HOME | End: 2022-03-15
Attending: EMERGENCY MEDICINE

## 2022-03-15 VITALS
BODY MASS INDEX: 23.74 KG/M2 | HEIGHT: 74 IN | RESPIRATION RATE: 20 BRPM | HEART RATE: 91 BPM | DIASTOLIC BLOOD PRESSURE: 79 MMHG | WEIGHT: 185 LBS | OXYGEN SATURATION: 97 % | SYSTOLIC BLOOD PRESSURE: 134 MMHG | TEMPERATURE: 97 F

## 2022-03-15 DIAGNOSIS — A08.4 VIRAL GASTROENTERITIS: Primary | ICD-10-CM

## 2022-03-15 PROCEDURE — 25000003 PHARM REV CODE 250: Mod: ER | Performed by: EMERGENCY MEDICINE

## 2022-03-15 PROCEDURE — 99283 EMERGENCY DEPT VISIT LOW MDM: CPT | Mod: ER

## 2022-03-15 RX ORDER — LOPERAMIDE HYDROCHLORIDE 2 MG/1
2 CAPSULE ORAL 4 TIMES DAILY PRN
Qty: 12 CAPSULE | Refills: 0 | Status: SHIPPED | OUTPATIENT
Start: 2022-03-15 | End: 2022-03-25

## 2022-03-15 RX ORDER — LOPERAMIDE HYDROCHLORIDE 2 MG/1
4 CAPSULE ORAL
Status: COMPLETED | OUTPATIENT
Start: 2022-03-15 | End: 2022-03-15

## 2022-03-15 RX ORDER — ONDANSETRON 4 MG/1
4 TABLET, ORALLY DISINTEGRATING ORAL
Status: COMPLETED | OUTPATIENT
Start: 2022-03-15 | End: 2022-03-15

## 2022-03-15 RX ADMIN — ONDANSETRON 4 MG: 4 TABLET, ORALLY DISINTEGRATING ORAL at 05:03

## 2022-03-15 RX ADMIN — LOPERAMIDE HYDROCHLORIDE 4 MG: 2 CAPSULE ORAL at 06:03

## 2022-03-15 NOTE — Clinical Note
"Savage Baez (ERNEST) was seen and treated in our emergency department on 3/15/2022.  He may return to work on 03/17/2022.       If you have any questions or concerns, please don't hesitate to call.       RN    "

## 2022-03-15 NOTE — ED PROVIDER NOTES
Encounter Date: 3/15/2022       History     Chief Complaint   Patient presents with    Diarrhea     Pt to the ER with c/o diarrhea x 2 days. Some pain, nausea without vomiting.      Patient presents with diarrhea without vomiting or nausea since last night.  States that he has had 5 loose watery stools.  Slight abdominal cramping but not really.  No fever chills.  No obvious bad food exposure or travel.  Does not feel dehydrated.  No lightheadedness or near syncope.  Patient has presented for viral syndromes in the past.        Review of patient's allergies indicates:  No Known Allergies  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    Asthma      Past Surgical History:   Procedure Laterality Date    anomalous coronary artery repair Right 12/19/2017     Family History   Problem Relation Age of Onset    No Known Problems Mother     No Known Problems Father     No Known Problems Sister     No Known Problems Brother     Asthma Sister     No Known Problems Brother      Social History     Tobacco Use    Smoking status: Never Smoker    Smokeless tobacco: Never Used   Substance Use Topics    Alcohol use: Never    Drug use: Never     Review of Systems   Constitutional: Negative for fatigue and fever.   Respiratory: Negative for shortness of breath.    Cardiovascular: Negative for chest pain.   Gastrointestinal: Negative for abdominal pain and blood in stool.       Physical Exam     Initial Vitals [03/15/22 0544]   BP Pulse Resp Temp SpO2   134/79 91 20 97 °F (36.1 °C) 97 %      MAP       --         Physical Exam    Constitutional: He appears well-developed and well-nourished. He is not diaphoretic. No distress.   HENT:   Head: Normocephalic and atraumatic.   Mouth/Throat: Oropharynx is clear and moist.   Eyes: EOM are normal.   Neck: Neck supple.   Cardiovascular: Normal rate, regular rhythm and normal heart  sounds.   Pulmonary/Chest: No respiratory distress.   Abdominal: Abdomen is soft. He exhibits no distension. There is no abdominal tenderness. There is no rebound.   Musculoskeletal:         General: Normal range of motion.      Cervical back: Neck supple.     Neurological: He is alert and oriented to person, place, and time. He has normal strength.         ED Course   Procedures  Labs Reviewed - No data to display       Imaging Results    None          Medications   loperamide capsule 4 mg (has no administration in time range)   ondansetron disintegrating tablet 4 mg (4 mg Oral Given 3/15/22 0554)     Medical Decision Making:   Initial Assessment:   Viral gastroenteritis                      Clinical Impression:   Final diagnoses:  [A08.4] Viral gastroenteritis (Primary)          ED Disposition Condition    Discharge Stable        ED Prescriptions     Medication Sig Dispense Start Date End Date Auth. Provider    loperamide (IMODIUM) 2 mg capsule Take 1 capsule (2 mg total) by mouth 4 (four) times daily as needed for Diarrhea. 12 capsule 3/15/2022 3/25/2022 Jordi Reeder MD        Follow-up Information     Follow up With Specialties Details Why Contact Info    Neal Lazaro MD Pediatrics In 3 days As needed [address]  [address]  [facility]  [place] [id number]  [phone]        M*Modal Fluency dictation system has been used to dictate either all or a portion of this chart. Despite review of the chart, some dictation errors may still exist due to imperfections in this system.     Jordi Reeder MD  03/15/22 0613

## 2022-03-15 NOTE — ED TRIAGE NOTES
"Reports to ED c c/o diarrhea and nausea x 2 days. Pt reports "little" abd cramping. Denies vomiting or fever. Reports family was sick at home.   "

## 2022-04-10 ENCOUNTER — HOSPITAL ENCOUNTER (EMERGENCY)
Facility: HOSPITAL | Age: 22
Discharge: HOME | End: 2022-04-10
Attending: EMERGENCY MEDICINE

## 2022-04-10 VITALS
SYSTOLIC BLOOD PRESSURE: 161 MMHG | BODY MASS INDEX: 23.74 KG/M2 | HEART RATE: 93 BPM | OXYGEN SATURATION: 97 % | TEMPERATURE: 98 F | HEIGHT: 74 IN | DIASTOLIC BLOOD PRESSURE: 93 MMHG | RESPIRATION RATE: 18 BRPM | WEIGHT: 185 LBS

## 2022-04-10 DIAGNOSIS — S63.257A CLOSED DISLOCATION OF LEFT LITTLE FINGER: Primary | ICD-10-CM

## 2022-04-10 PROCEDURE — 26775 TREAT FINGER DISLOCATION: CPT | Mod: LT,ER

## 2022-04-10 PROCEDURE — 25000003 PHARM REV CODE 250: Mod: ER | Performed by: PHYSICIAN ASSISTANT

## 2022-04-10 PROCEDURE — 99284 EMERGENCY DEPT VISIT MOD MDM: CPT | Mod: 25,ER

## 2022-04-10 RX ORDER — KETOROLAC TROMETHAMINE 10 MG/1
10 TABLET, FILM COATED ORAL EVERY 6 HOURS
Qty: 12 TABLET | Refills: 0 | Status: SHIPPED | OUTPATIENT
Start: 2022-04-10 | End: 2022-04-13

## 2022-04-10 RX ORDER — OXYCODONE AND ACETAMINOPHEN 5; 325 MG/1; MG/1
2 TABLET ORAL
Status: COMPLETED | OUTPATIENT
Start: 2022-04-10 | End: 2022-04-10

## 2022-04-10 RX ORDER — LIDOCAINE HYDROCHLORIDE 10 MG/ML
10 INJECTION INFILTRATION; PERINEURAL
Status: COMPLETED | OUTPATIENT
Start: 2022-04-10 | End: 2022-04-10

## 2022-04-10 RX ORDER — OXYCODONE AND ACETAMINOPHEN 5; 325 MG/1; MG/1
1 TABLET ORAL EVERY 4 HOURS PRN
Qty: 6 TABLET | Refills: 0 | Status: SHIPPED | OUTPATIENT
Start: 2022-04-10 | End: 2022-12-31 | Stop reason: CLARIF

## 2022-04-10 RX ADMIN — OXYCODONE HYDROCHLORIDE AND ACETAMINOPHEN 2 TABLET: 5; 325 TABLET ORAL at 06:04

## 2022-04-10 RX ADMIN — LIDOCAINE HYDROCHLORIDE 10 ML: 10 INJECTION, SOLUTION INFILTRATION; PERINEURAL at 07:04

## 2022-04-10 NOTE — Clinical Note
"Brenda BAXTERWINTER Baez was seen and treated in our emergency department on 4/10/2022.  He may return to work after being cleared by follow-up physician 04/12/2022.       If you have any questions or concerns, please don't hesitate to call.      Maribell Oswald RN"

## 2022-04-10 NOTE — ED PROVIDER NOTES
Encounter Date: 4/10/2022       History     Chief Complaint   Patient presents with    Hand Pain     Pt was playing basketball jammed left 5th digit. Obvious deformity. No open wounds     HPI: Savage NOLASCO Nestor Lr., a 21 y.o. male  has a past medical history of Anomalous coronary artery communication and Asthma.     He presents to the ED for evaluation of left finger pain and deformity after getting area jammed while playing basketball.  No treatments tried PTA.  Pt is right hand dominant. No previous h/o fracture or surgery to site.        The history is provided by the patient.     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   Musculoskeletal: Positive for arthralgias.   Skin: Negative for color change.   Neurological: Negative for weakness and numbness.   Psychiatric/Behavioral: Negative for agitation.   All other systems reviewed and are negative.      Physical Exam     Initial Vitals [04/10/22 1849]   BP Pulse Resp Temp SpO2   (!) 161/93 (!) 113 18 98.1 °F (36.7 °C) 97 %      MAP       --         Physical Exam    Nursing note and vitals reviewed.  Constitutional: He appears well-developed and well-nourished. He is not diaphoretic. No distress.   HENT:   Head: Normocephalic and atraumatic.    Right Ear: External ear normal.   Left Ear: External ear normal.   Eyes: Conjunctivae are normal.   Cardiovascular: Normal rate and regular rhythm.   Pulmonary/Chest: No respiratory distress.   Musculoskeletal:      Left hand: Deformity, tenderness and bony tenderness present. No swelling or lacerations. Decreased range of motion. Normal strength. Normal sensation. There is no disruption of two-point discrimination. Normal capillary refill.     Neurological: He is alert and oriented to person, place, and time.   Skin: Capillary refill takes less than 2 seconds.   Psychiatric: He has a normal mood and affect. Thought content normal.         ED Course   Orthopedic Injury    Date/Time: 4/10/2022 7:45 PM  Performed by: Dilia Koo PA-C  Authorized by: Paddy Bobby MD     Location procedure was performed:  [facility] EMERGENCY DEPARTMENT  Injury:     Injury location:  Finger    Location details:  Left little finger    Injury type:  Dislocation    Dislocation type: IP        Pre-procedure assessment:     Distal perfusion: normal      Neurological function: normal      Range of motion: reduced      Local anesthesia used?: Yes      Anesthesia:  Digital block    Local anesthetic:  Lidocaine 1% without epinephrine    Patient sedated?: No        Procedure details:     Description of findings:  Dislocation of IP of left small finger  Selections made in this section will also lock the Injury type section above.:     Manipulation performed?: Yes      Reduction successful?: Yes      Confirmation: Reduction confirmed by x-ray      Immobilization:  Splint    Splint type:  Static finger    Supplies used:  Aluminum splint    Technical Procedures Used:  Manual manipulation     Significant surgical tasks conducted by the assistant(s):  None    Complications: No      Estimated blood loss (mL):  0    Specimens: No      Implants: No    Post-procedure assessment:     Distal perfusion: normal      Neurological function: diminished       Neurological function comment:  Digital blocked    Range of motion: improved      Patient tolerance:  Patient tolerated the procedure well with no immediate complications      Labs Reviewed - No data to display       Imaging Results          X-Ray Finger 2 or More Views Left (Final result)  Result time 04/10/22 19:57:19    Final result by Hay Dias MD (04/10/22 19:57:19)                 Impression:      Anatomic alignment status post reduction.  No fracture identified.      Electronically signed by: Hay Dias  Date:    04/10/2022  Time:    19:57             Narrative:    EXAMINATION:  XR FINGER 2 OR MORE VIEWS LEFT    CLINICAL HISTORY:  reduction;    TECHNIQUE:  Three views left fingers    COMPARISON:  None    FINDINGS:  Anatomic alignment status post reduction.  No fracture identified.                               X-Ray Finger 2 or More Views Left (Final result)  Result time 04/10/22 19:13:41    Final result by Hay Dias MD (04/10/22 19:13:41)                 Impression:      As above.      Electronically signed by: Hay Dias  Date:    04/10/2022  Time:    19:13             Narrative:    EXAMINATION:  XR FINGER 2 OR MORE VIEWS LEFT    CLINICAL HISTORY:  left finger pain;    TECHNIQUE:  Two views left fingers    COMPARISON:  None    FINDINGS:  Dislocation of the interphalangeal joint of the 5th digit with the middle phalanx dorsally and laterally dislocated in comparison to the proximal phalanx.  Possible small impaction fracture.  Suggest post reduction imaging.                                 Medications   oxyCODONE-acetaminophen 5-325 mg per tablet 2 tablet (2 tablets Oral Given 4/10/22 1852)   LIDOcaine HCL 10 mg/ml (1%) injection 10 mL (10 mLs Infiltration Given 4/10/22 1914)     Medical Decision Making:   Initial Assessment:   Finger deformity   Differential Diagnosis:   Dislocation, fracture, ligamentous injury   Clinical Tests:   Radiological Study: Ordered and Reviewed  ED  Management:  Pt presents to ED for evaluation of left small finger deformity and pain.  X-ray consistent with dislocation.  Digital block performed and area was successfully reduced.  Static splint placed.  Pt given information on reasons to return and ambulatory referral placed for follow up.                        Clinical Impression:   Final diagnoses:  [S60.479T] Closed dislocation of left little finger (Primary)          ED Disposition Condition    Discharge Stable        ED Prescriptions     Medication Sig Dispense Start Date End Date Auth. Provider    oxyCODONE-acetaminophen (PERCOCET) 5-325 mg per tablet Take 1 tablet by mouth every 4 (four) hours as needed for Pain. 6 tablet 4/10/2022  Dilia Koo PA-C    ketorolac (TORADOL) 10 mg tablet Take 1 tablet (10 mg total) by mouth every 6 (six) hours. for 3 days 12 tablet 4/10/2022 4/13/2022 Dilia Koo PA-C        Follow-up Information    None          Dilia Koo PA-C  04/10/22 9288

## 2022-09-19 ENCOUNTER — HOSPITAL ENCOUNTER (EMERGENCY)
Facility: HOSPITAL | Age: 22
Discharge: HOME | End: 2022-09-19
Attending: FAMILY MEDICINE

## 2022-09-19 VITALS
RESPIRATION RATE: 17 BRPM | HEART RATE: 70 BPM | DIASTOLIC BLOOD PRESSURE: 78 MMHG | TEMPERATURE: 98 F | WEIGHT: 185 LBS | SYSTOLIC BLOOD PRESSURE: 122 MMHG | BODY MASS INDEX: 23.75 KG/M2 | OXYGEN SATURATION: 99 %

## 2022-09-19 DIAGNOSIS — J01.90 ACUTE SINUSITIS, RECURRENCE NOT SPECIFIED, UNSPECIFIED LOCATION: Primary | ICD-10-CM

## 2022-09-19 LAB
INFLUENZA A, MOLECULAR: NEGATIVE
INFLUENZA B, MOLECULAR: NEGATIVE
SARS-COV-2 RDRP RESP QL NAA+PROBE: NEGATIVE
SPECIMEN SOURCE: NORMAL

## 2022-09-19 PROCEDURE — 87502 INFLUENZA DNA AMP PROBE: CPT | Mod: ER

## 2022-09-19 PROCEDURE — 99283 EMERGENCY DEPT VISIT LOW MDM: CPT | Mod: 25,ER

## 2022-09-19 PROCEDURE — 87502 INFLUENZA DNA AMP PROBE: CPT | Mod: ER | Performed by: PHYSICIAN ASSISTANT

## 2022-09-19 PROCEDURE — U0002 COVID-19 LAB TEST NON-CDC: HCPCS | Mod: ER | Performed by: PHYSICIAN ASSISTANT

## 2022-09-19 RX ORDER — LORATADINE 10 MG/1
10 TABLET ORAL DAILY
Qty: 30 TABLET | Refills: 0 | Status: SHIPPED | OUTPATIENT
Start: 2022-09-19 | End: 2022-12-31 | Stop reason: CLARIF

## 2022-09-19 NOTE — Clinical Note
"Brenda BAXTERWINTER Baez was seen and treated in our emergency department on 9/19/2022.  He may return to work on 09/20/2022.       If you have any questions or concerns, please don't hesitate to call.      Elliot Manuel PA-C"

## 2022-09-19 NOTE — FIRST PROVIDER EVALUATION
" Emergency Department TeleTriage Encounter Note      CHIEF COMPLAINT    Chief Complaint   Patient presents with    Nasal Congestion     "[place] been congested for a few days and its getting aggravating" +runny nose Denies cough, sore throat, or fever       VITAL SIGNS   Initial Vitals   BP Pulse Resp Temp SpO2   09/19/22 1520 09/19/22 1520 09/19/22 1519 09/19/22 1519 09/19/22 1520   (!) 135/95 73 17 97.7 °F (36.5 °C) 99 %      MAP       --                   ALLERGIES    Review of patient's allergies indicates:  No Known Allergies    PROVIDER TRIAGE NOTE  This is a teletriage evaluation of a 22 y.o. male presenting to the ED with c/o nasal congestion for the past 2 days.  Pt took Zyrtec 2 days ago.  Pt denies trying anything today.  No sick contacts  .     PE: VSS.  NAD noted.  Nasal congestion noted on exam.     Plan: monitor    Limited physical exam via telehealth: The patient is awake, alert, answering questions appropriately and is not in respiratory distress. All ED beds are full at present; patient notified of this status.  Patient seen and medically screened by Nurse Practitioner via teletriage.      Initial orders will be placed and care will be transferred to an alternate provider when patient is roomed for a full evaluation. Any additional orders and the final disposition will be determined by that provider.         ORDERS  Labs Reviewed - No data to display    ED Orders (720h ago, onward)      None              Virtual Visit Note: The provider triage portion of this emergency department evaluation and documentation was performed via [employer], a HIPAA-compliant telemedicine application, in concert with a tele-presenter in the room. A face to face patient evaluation with one of my colleagues will occur once the patient is placed in an emergency department room.      DISCLAIMER: This note was prepared with [employer] voice recognition transcription software. Garbled syntax, mangled pronouns, and other bizarre " constructions may be attributed to that software system.

## 2022-09-19 NOTE — DISCHARGE INSTRUCTIONS

## 2022-09-19 NOTE — ED PROVIDER NOTES
"Encounter Date: 9/19/2022       History     Chief Complaint   Patient presents with    Nasal Congestion     "[place] been congested for a few days and its getting aggravating" +runny nose Denies cough, sore throat, or fever     22-year-old male presents to ED with concern 2 day onset nasal congestion.  No known sick contacts.  He denies any associated fevers, chills, nausea, vomiting, headaches, body aches, ear pain, sore throat, chest pain, cough, shortness of breath, abdominal pain, diarrhea.  He states he did take Zyrtec 2 days ago with mild but temporary improvement.  He reports he typically takes Claritin but he recently ran out.  No other acute complaints at this time.    The history is provided by the patient.   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   HENT:  Positive for congestion. Negative for ear pain and sore throat.    Respiratory:  Negative for cough and shortness of breath.    Cardiovascular:  Negative for chest pain.   Gastrointestinal:  Negative for abdominal pain, diarrhea, nausea and vomiting.   Musculoskeletal:  Negative for myalgias, neck pain and neck stiffness.   Neurological:  Negative for headaches.     Physical Exam     Initial Vitals   BP Pulse Resp Temp " SpO2   09/19/22 1520 09/19/22 1520 09/19/22 1519 09/19/22 1519 09/19/22 1520   (!) 135/95 73 17 97.7 °F (36.5 °C) 99 %      MAP       --                Physical Exam    Nursing note and vitals reviewed.  Constitutional: Vital signs are normal. He appears well-developed and well-nourished. He is cooperative. He does not have a sickly appearance. He does not appear ill. No distress.   HENT:   Head: Normocephalic and atraumatic.   Nose: Nose normal.   Mouth/Throat: Uvula is midline and oropharynx is clear and moist.   Eyes: EOM are normal.   Neck:   Normal range of motion.  Musculoskeletal:      Cervical back: Normal range of motion.     Neurological: He is alert. GCS eye subscore is 4. GCS verbal subscore is 5. GCS motor subscore is 6.   Psychiatric: He has a normal mood and affect. His speech is normal and behavior is normal.       ED Course   Procedures  Labs Reviewed   INFLUENZA A & B BY MOLECULAR   SARS-COV-2 RNA AMPLIFICATION, QUAL    Narrative:     Is the patient symptomatic?->Yes          Imaging Results    None          Medications - No data to display  Medical Decision Making:   Initial Assessment:   Patient presents with concern nasal congestion that began 2 days ago.  No known sick contacts.  Denying any other URI like symptoms.  Afebrile with vitals grossly unremarkable.  Patient otherwise well-appearing, no distress.  Differential Diagnosis:   Viral, URI, allergy/irritant, rhinitis  ED Management:  COVID and flu tests are negative.  Will plan to continue with conservative care.  Prescription for Claritin.  Encouraged PCP follow-up                        Clinical Impression:   Final diagnoses:  [J01.90] Acute sinusitis, recurrence not specified, unspecified location (Primary)      ED Disposition Condition    Discharge Stable          ED Prescriptions       Medication Sig Dispense Start Date End Date Auth. Provider    loratadine (CLARITIN) 10 mg tablet Take 1 tablet (10 mg total) by mouth once daily. 30  tablet 9/19/2022 9/19/2023 Elliot Manuel PA-C          Follow-up Information       Follow up With Specialties Details Why Contact Info    Neal Lazaro MD Pediatrics   [address]  [address]  [facility]  [place] [address] [phone]               Elliot Manuel PA-C  09/19/22 6193

## 2022-12-31 ENCOUNTER — HOSPITAL ENCOUNTER (EMERGENCY)
Facility: HOSPITAL | Age: 22
Discharge: HOME | End: 2022-12-31
Attending: EMERGENCY MEDICINE

## 2022-12-31 VITALS
WEIGHT: 185 LBS | HEART RATE: 70 BPM | BODY MASS INDEX: 23.75 KG/M2 | OXYGEN SATURATION: 99 % | RESPIRATION RATE: 18 BRPM | TEMPERATURE: 98 F | SYSTOLIC BLOOD PRESSURE: 126 MMHG | DIASTOLIC BLOOD PRESSURE: 86 MMHG

## 2022-12-31 DIAGNOSIS — J02.9 VIRAL PHARYNGITIS: Primary | ICD-10-CM

## 2022-12-31 DIAGNOSIS — J03.90 TONSILLITIS: ICD-10-CM

## 2022-12-31 LAB
CTP QC/QA: YES
GROUP A STREP, MOLECULAR: NEGATIVE
INFLUENZA A, MOLECULAR: NEGATIVE
INFLUENZA B, MOLECULAR: NEGATIVE
SARS-COV-2 RDRP RESP QL NAA+PROBE: NEGATIVE
SPECIMEN SOURCE: NORMAL

## 2022-12-31 PROCEDURE — 63600175 PHARM REV CODE 636 W HCPCS: Mod: ER | Performed by: EMERGENCY MEDICINE

## 2022-12-31 PROCEDURE — 99284 EMERGENCY DEPT VISIT MOD MDM: CPT | Mod: ER

## 2022-12-31 PROCEDURE — 87651 STREP A DNA AMP PROBE: CPT | Mod: ER | Performed by: EMERGENCY MEDICINE

## 2022-12-31 PROCEDURE — 87635 SARS-COV-2 COVID-19 AMP PRB: CPT | Mod: ER | Performed by: EMERGENCY MEDICINE

## 2022-12-31 PROCEDURE — 87502 INFLUENZA DNA AMP PROBE: CPT | Mod: ER | Performed by: EMERGENCY MEDICINE

## 2022-12-31 PROCEDURE — 96372 THER/PROPH/DIAG INJ SC/IM: CPT | Performed by: EMERGENCY MEDICINE

## 2022-12-31 RX ORDER — AZITHROMYCIN 250 MG/1
250 TABLET, FILM COATED ORAL DAILY
Qty: 6 TABLET | Refills: 0 | Status: SHIPPED | OUTPATIENT
Start: 2022-12-31

## 2022-12-31 RX ORDER — IBUPROFEN 600 MG/1
600 TABLET ORAL EVERY 6 HOURS PRN
Qty: 20 TABLET | Refills: 0 | Status: SHIPPED | OUTPATIENT
Start: 2022-12-31

## 2022-12-31 RX ORDER — DEXAMETHASONE SODIUM PHOSPHATE 4 MG/ML
8 INJECTION, SOLUTION INTRA-ARTICULAR; INTRALESIONAL; INTRAMUSCULAR; INTRAVENOUS; SOFT TISSUE
Status: COMPLETED | OUTPATIENT
Start: 2022-12-31 | End: 2022-12-31

## 2022-12-31 RX ADMIN — DEXAMETHASONE SODIUM PHOSPHATE 8 MG: 4 INJECTION, SOLUTION INTRA-ARTICULAR; INTRALESIONAL; INTRAMUSCULAR; INTRAVENOUS; SOFT TISSUE at 10:12

## 2022-12-31 NOTE — ED PROVIDER NOTES
Encounter Date: 12/31/2022       History     Chief Complaint   Patient presents with    Sore Throat     Sore throat for 3 days     Patient is a 22-year-old male who presents to the ED with complaint of sore throat.  Patient reports 3 days of sore throat without associated fever, chest pain or cough, shortness of breath nausea vomiting or diarrhea.  He is not taken anything for symptoms.  Denies any known sick contacts.    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   HENT:  Positive for sore throat. Negative for sinus pressure and sinus pain.    Respiratory:  Negative for chest tightness and shortness of breath.    Cardiovascular:  Negative for chest pain, palpitations and leg swelling.   Gastrointestinal:  Negative for abdominal pain, nausea and vomiting.   Musculoskeletal:  Negative for back pain and myalgias.   Psychiatric/Behavioral:  Negative for agitation and confusion.      Physical Exam     Initial Vitals [12/31/22 0951]   BP Pulse Resp Temp SpO2   122/88 72 15 98.2 °F (36.8 °C) 98 %      MAP       --         Physical Exam    Constitutional: He appears well-developed and well-nourished.   HENT:   Head: Normocephalic and atraumatic.   Right Ear: External ear normal.   Left Ear: External ear normal.    Mouth/Throat: No trismus in the jaw. No uvula swelling. Posterior oropharyngeal erythema present. No oropharyngeal exudate or tonsillar abscesses.   Enlarged erythematous tonsils bilaterally approximately size 2.         ED Course   Procedures  Labs Reviewed   INFLUENZA A & B BY MOLECULAR   GROUP A STREP, MOLECULAR   SARS-COV-2 RDRP GENE          Imaging Results    None          Medications   dexAMETHasone injection 8 mg (8 mg Intramuscular Given 12/31/22 1023)     Medical Decision Making:   Initial Assessment:   22-year-old male presents ED with complaint of 3 days of sore throat.  Denies any associated fever, difficulty swallowing, cough, chills, nausea, vomiting, diarrhea or other complaints.  He is not taken anything for symptoms.  Differential Diagnosis:   Group a strep, viral pharyngitis, tonsillitis, peritonsillar abscess, retropharyngeal abscess, COVID-19 infection, postnasal drip  Clinical Tests:   Lab Tests: Ordered and Reviewed  ED Management:  - vital signs stable; patient afebrile in no acute distress noted; group a strep swab, influenza swab and COVID-19 swabs were all negative; patient with moderately large tonsils suggestive of tonsillitis will give IM dexamethasone in the ED and presumptively treat for possible developing infection with macrolide antibiotics; patient encouraged to follow-up with ENT as an outpatient; patient stable for discharge at this time  - - No further intervention is indicated at this time after having taken into account the patient's history, physical exam findings, and empirical and objective data obtained during the patient's emergency department workup.   - The patient is at low risk for an emergent medical condition at this time, and I am of the belief that that it is safe to discharge the patient from the emergency department.   - The patient is instructed to follow up as outpatient as indicated on the discharge paperwork.    - I have discussed the specifics of the  workup with the patient and the patient has verbalized understanding of the details of the workup, the diagnosis, the treatment plan, and the need for outpatient follow-up.    - Although the patient has no emergent etiology today this does not preclude the development of an emergent condition so, in addition, I have advised the patient that they can return to the ED and/or activate EMS at any time with worsening of their symptoms, change of their symptoms, or with any other medical complaint.    - The patient remained comfortable and stable during their visit in the ED.    - Discharge and follow-up instructions discussed with the patient who expressed understanding and willingness to comply with my recommendations.  - Results of all emergency department tests  discussed thoroughly with patient; all patient questions answered; pt in agreement with plan  - Pt instructed to follow up with PCP in 2-3 days for recheck of today's complaints  - Pt given strict emergency department return precautions for any new or worsening of symptoms  - Pt discharged from the emergency department in stable condition, in no acute distress              ED Course as of 12/31/22 1207   Sat Dec 31, 2022   1206 Group A Strep, Molecular: Negative [LC]   1206 SARS-CoV-2 RNA, Amplification, Qual: Negative [LC]   1206 Influenza A, Molecular: Negative [LC]   1206 Influenza B, Molecular: Negative [LC]      ED Course User Index  [LC] Dontae Ramírez MD                 Clinical Impression:   Final diagnoses:  [J02.9] Viral pharyngitis (Primary)  [J03.90] Tonsillitis        ED Disposition Condition    Discharge Stable          ED Prescriptions       Medication Sig Dispense Start Date End Date Auth. Provider    ibuprofen (ADVIL,MOTRIN) 600 MG tablet Take 1 tablet (600 mg total) by mouth every 6 (six) hours as needed for Pain. 20 tablet 12/31/2022 -- Dontae Ramírez MD    azithromycin (Z-LIZ) 250 MG tablet Take 1 tablet (250 mg total) by mouth  once daily. Take first 2 tablets together, then 1 every day until finished. 6 tablet 12/31/2022 -- Dontae Ramírez MD          Follow-up Information       Follow up With Specialties Details Why Contact Info    Neal Lazaro MD Pediatrics Schedule an appointment as soon as possible for a visit   [address] [place] [address]  [facility]  [place] [address]  [phone]               Dontae Ramírez MD  12/31/22 7912

## 2023-07-24 NOTE — PROGRESS NOTES
Echo done   Post-care instructions were reviewed in detail. A handout was provided. Patient is not to engage in any heavy lifting, exercise, or swimming for the next 14 days. Should the patient develop any fevers, chills, bleeding, severe pain patient will contact the office immediately.\\n\\nA dose of both Tylenol 1000mg and ibuprofen 400mg (at the same time) was recommended shortly after surgery, and every 6 hours as needed for pain. Pain not adequately relieved or severe pain should be reported to our office or the provider on call. Avoid any additional aspirin containing products. Cell phone numbers of the providers were handed out. \\n\\nIce packs should also be used post surgically and may be placed over the wound dressing to help with pain and swelling, and bleeding. The ice pack is placed over the wound for fifteen minutes and may be repeated four to six times per day for 2-3 days.

## 2023-10-09 NOTE — PLAN OF CARE
Assigned SW attempted to verify assessment information provided by pt's non FDC parent- sw called pts mother ([phone] and [phone]), no answer, unable to leave message.    Latrice Venegas, LCSW [id number]   Name band;

## 2024-03-20 ENCOUNTER — HOSPITAL ENCOUNTER (EMERGENCY)
Facility: HOSPITAL | Age: 24
Discharge: HOME | End: 2024-03-20
Attending: EMERGENCY MEDICINE

## 2024-03-20 VITALS
BODY MASS INDEX: 26.48 KG/M2 | TEMPERATURE: 98 F | RESPIRATION RATE: 18 BRPM | OXYGEN SATURATION: 98 % | DIASTOLIC BLOOD PRESSURE: 84 MMHG | HEIGHT: 70 IN | WEIGHT: 185 LBS | SYSTOLIC BLOOD PRESSURE: 144 MMHG | HEART RATE: 72 BPM

## 2024-03-20 DIAGNOSIS — J06.9 VIRAL URI WITH COUGH: Primary | ICD-10-CM

## 2024-03-20 LAB
GROUP A STREP, MOLECULAR: NEGATIVE
INFLUENZA A, MOLECULAR: NEGATIVE
INFLUENZA B, MOLECULAR: NEGATIVE
SARS-COV-2 RDRP RESP QL NAA+PROBE: NEGATIVE
SPECIMEN SOURCE: NORMAL

## 2024-03-20 PROCEDURE — 99284 EMERGENCY DEPT VISIT MOD MDM: CPT | Mod: ER

## 2024-03-20 PROCEDURE — 87651 STREP A DNA AMP PROBE: CPT | Mod: ER

## 2024-03-20 PROCEDURE — 87502 INFLUENZA DNA AMP PROBE: CPT | Mod: ER

## 2024-03-20 PROCEDURE — U0002 COVID-19 LAB TEST NON-CDC: HCPCS | Mod: ER

## 2024-03-20 RX ORDER — BENZONATATE 100 MG/1
100 CAPSULE ORAL 2 TIMES DAILY
Qty: 10 CAPSULE | Refills: 0 | Status: SHIPPED | OUTPATIENT
Start: 2024-03-20 | End: 2024-03-25

## 2024-03-20 RX ORDER — CETIRIZINE HYDROCHLORIDE 10 MG/1
10 TABLET ORAL DAILY
Qty: 14 TABLET | Refills: 0 | Status: SHIPPED | OUTPATIENT
Start: 2024-03-20 | End: 2024-04-03

## 2024-03-20 RX ORDER — GUAIFENESIN 600 MG/1
1200 TABLET, EXTENDED RELEASE ORAL 2 TIMES DAILY
Qty: 28 TABLET | Refills: 0 | Status: SHIPPED | OUTPATIENT
Start: 2024-03-20 | End: 2024-03-27

## 2024-03-20 RX ORDER — AZELASTINE 1 MG/ML
1 SPRAY, METERED NASAL 2 TIMES DAILY
Qty: 30 ML | Refills: 0 | Status: SHIPPED | OUTPATIENT
Start: 2024-03-20 | End: 2025-03-20

## 2024-03-20 NOTE — DISCHARGE INSTRUCTIONS
Thank you for letting me care for you today - it was nice to meet you and I hope you feel better soon. Please return to the ER if your symptoms don't improve or get worse. And be sure to follow up with your primary care provider within the next week for follow up care.     Rotate between Tylenol and ibuprofen (Motrin), switching every 3 hours. For example, Tylenol at 8am, ibuprofen at 11am, tylenol at 2pm.  Do not take more than 3000 mg of Tylenol in 1 day or more than 2400 mg of ibuprofen and 1 day.     Our goal at [facility] is to always give you outstanding care and exceptional service. You may receive a survey by mail or email in the next week about your experience in our ED. We would greatly appreciate you completing and returning the survey. Your feedback provides us with a way to recognize our staff who give very good care and it helps us learn how to improve when your experience was below our aspiration of excellence.     All the best,     Marysol Clark, MPH, PA-C  Emergency Department Physician Assistant  [facility]

## 2024-03-20 NOTE — Clinical Note
"Brenda BAXTERWINTER Baez was seen and treated in our emergency department on 3/20/2024.  He may return to work on 03/21/2024.       If you have any questions or concerns, please don't hesitate to call.      Marysol Clark PA-C"

## 2024-03-20 NOTE — ED PROVIDER NOTES
Encounter Date: 3/20/2024       History     Chief Complaint   Patient presents with    COVID-19 Concerns     Congestion, aches, sore throat, started yesterday      Patient is a 23 y.o. male who presents with congestion, sore throat, body aches that began yesterday. Patient does not reports close contacts with similar symptoms. Patient has taken robitussin for symptom relief.  Denies fever, headache, chest pain, shortness of breath, cough, wheezing, stridor, drooling, NVD, abdominal pain, constipation, urinary problems, joint problems, rashes, or any other complaints at this time.      The history is provided by the patient.     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   HENT:  Positive for congestion, postnasal drip, rhinorrhea and sore throat. Negative for ear discharge, ear pain, facial swelling, trouble swallowing and voice change.    Respiratory:  Negative for cough, shortness of breath and wheezing.    Cardiovascular:  Negative for chest pain.   Gastrointestinal:  Negative for abdominal distention, abdominal pain, diarrhea, nausea and vomiting.   Genitourinary:  Negative for dysuria, flank pain, frequency and hematuria.   Musculoskeletal:  Negative for back pain, neck pain and neck stiffness.    Skin:  Negative for rash.   Neurological:  Negative for weakness.   Hematological:  Does not bruise/bleed easily.   All other systems reviewed and are negative.      Physical Exam     Initial Vitals [03/20/24 0934]   BP Pulse Resp Temp SpO2   (!) 144/84 72 18 97.8 °F (36.6 °C) 98 %      MAP       --         Physical Exam    Vitals reviewed.  Constitutional: He appears well-developed and well-nourished.   HENT:   Head: Normocephalic and atraumatic.   Nose: Rhinorrhea present.   Mouth/Throat: Uvula is midline. Posterior oropharyngeal erythema present. No oropharyngeal exudate or posterior oropharyngeal edema.   2+ tonsils, bilaterally.  Tonsils symmetrical. Tonsils are erythematous.  There is not tonsillar exudate.  No apparent PTA. Uvula midline.  There is not cervical adenopathy.    Eyes: EOM are normal. Pupils are equal, round, and reactive to light.   Neck:   Normal range of motion.  Cardiovascular:  Normal rate, regular rhythm and normal heart sounds.           Pulmonary/Chest: Breath sounds normal. No respiratory distress. He has no wheezes. He has no rhonchi. He has no rales.   Abdominal: Bowel sounds are normal. He exhibits no distension. There is no abdominal tenderness. There is no rebound.   Musculoskeletal:      Cervical back: Normal range of motion.     Lymphadenopathy:     He has no cervical adenopathy.   Neurological: He is alert and oriented to person, place, and time.         ED Course   Procedures  Labs Reviewed   GROUP A STREP, MOLECULAR   INFLUENZA A & B BY MOLECULAR   SARS-COV-2 RNA AMPLIFICATION, QUAL    Narrative:     Is the patient symptomatic?->Yes          Imaging Results    None          Medications - No data to display  Medical Decision Making  Patient is an afebrile, well-appearing 23 y.o. male. VSS. Denies fever, chest pain, SOB, wheezing, difficulty swallowing, neck pain, neck stiffness. Vital signs do not suggest sepsis. Lung sounds are clear and not consistent with pneumonia. There  is no neck pain or limited ROM to suggest retropharyngeal abscess or meningitis. Tolerating PO, non-toxic appearing, no hypoxia. The patient remained comfortable and stable during their visit in the ED.  Details of ED course documented in ED workup.     Differential Diagnosis includes, but is not limited to: COVID, influenza, viral URI, bacterial/viral pharyngitis    COVID test negative. Influenza test negative. Strep test negative.    All historical, clinical, and laboratory findings reviewed. Patient with constellation of symptoms most consistent with a viral URI. There are no concerning features on physical exam to suggest an emergent or life threatening condition or an invasive bacterial infection, including, but not limited to: bacterial otitis media/externa, sinusitis, pharyngitis, or peritonsillar abscess. No further intervention is indicated at this time. The patient is at low risk for an emergent/life threatening medical condition at this time, and I am of the belief that that it is safe to discharge the patient from the emergency department.     Patient instructed to follow up with PCP in 2-3 days for recheck of today's complaints. Patient has been counseled regarding the need for follow-up as well as the indications to return to the emergency room should new or worrisome developments. Discharge and follow-up instructions discussed with the patient who expressed understanding and willingness to comply with recommendations. Patient discharged from the emergency department in stable condition, in no acute distress.     Amount and/or Complexity of Data Reviewed  Labs: ordered. Decision-making details documented in ED Course.    Risk  OTC drugs.  Prescription drug management.               ED Course as of 03/20/24 1605   Wed Mar 20, 2024   0935 Patient examined and assessed. Patient answering questions appropriately, speaking in complete sentences, respirations are even and unlabored.  AAO x 4.  [OB]   1002  Group A Strep, Molecular: Negative [OB]   1010 SARS-CoV-2 RNA, Amplification, Qual: Negative [OB]   1025 Influenza A, Molecular: Negative [OB]   1025 Influenza B, Molecular: Negative [OB]      ED Course User Index  [OB] Marysol Clark PA-C                           Clinical Impression:  Final diagnoses:  [J06.9] Viral URI with cough (Primary)          ED Disposition Condition    Discharge Stable          ED Prescriptions       Medication Sig Dispense Start Date End Date Auth. Provider    cetirizine (ZYRTEC) 10 MG tablet Take 1 tablet (10 mg total) by mouth once daily. for 14 days 14 tablet 3/20/2024 4/3/2024 Marysol Clark PA-C    benzonatate (TESSALON) 100 MG capsule Take 1 capsule (100 mg total) by mouth 2 (two) times a day. for 5 days 10 capsule 3/20/2024 3/25/2024 Marysol Clark PA-C    guaiFENesin (MUCINEX) 600 mg 12 hr tablet Take 2 tablets (1,200 mg total) by mouth 2 (two) times daily. for 7 days 28 tablet 3/20/2024 3/27/2024 Marysol Clark PA-C    azelastine (ASTELIN) 137 mcg (0.1 %) nasal spray 1 spray (137 mcg total) by Nasal route 2 (two) times daily. 30 mL 3/20/2024 3/20/2025 Marysol Clark PA-C          Follow-up Information       Follow up With Specialties Details Why Contact Info    Neal Lazaro MD Pediatrics In 3 days As needed, If symptoms worsen [facility] [address] [facility]  [address]  [place] [facility]  [place] [address] [phone]               Marysol Clark PA-C  03/20/24 3887

## 2024-08-12 ENCOUNTER — HOSPITAL ENCOUNTER (EMERGENCY)
Facility: HOSPITAL | Age: 24
Discharge: HOME | End: 2024-08-12
Attending: EMERGENCY MEDICINE

## 2024-08-12 VITALS
BODY MASS INDEX: 26.48 KG/M2 | WEIGHT: 185 LBS | HEIGHT: 70 IN | SYSTOLIC BLOOD PRESSURE: 137 MMHG | OXYGEN SATURATION: 100 % | DIASTOLIC BLOOD PRESSURE: 84 MMHG | TEMPERATURE: 99 F | HEART RATE: 88 BPM | RESPIRATION RATE: 16 BRPM

## 2024-08-12 DIAGNOSIS — M43.6 TORTICOLLIS: Primary | ICD-10-CM

## 2024-08-12 PROCEDURE — 25000003 PHARM REV CODE 250: Mod: ER | Performed by: NURSE PRACTITIONER

## 2024-08-12 PROCEDURE — 99284 EMERGENCY DEPT VISIT MOD MDM: CPT | Mod: ER

## 2024-08-12 RX ORDER — LIDOCAINE 50 MG/G
1 PATCH TOPICAL
Status: DISCONTINUED | OUTPATIENT
Start: 2024-08-12 | End: 2024-08-12 | Stop reason: HOSPADM

## 2024-08-12 RX ORDER — KETOROLAC TROMETHAMINE 10 MG/1
10 TABLET, FILM COATED ORAL
Status: COMPLETED | OUTPATIENT
Start: 2024-08-12 | End: 2024-08-12

## 2024-08-12 RX ORDER — IBUPROFEN 800 MG/1
800 TABLET ORAL EVERY 6 HOURS PRN
Qty: 20 TABLET | Refills: 0 | Status: SHIPPED | OUTPATIENT
Start: 2024-08-12

## 2024-08-12 RX ORDER — OXYCODONE AND ACETAMINOPHEN 5; 325 MG/1; MG/1
1 TABLET ORAL
Status: COMPLETED | OUTPATIENT
Start: 2024-08-12 | End: 2024-08-12

## 2024-08-12 RX ORDER — LIDOCAINE 50 MG/G
1 PATCH TOPICAL DAILY
Qty: 7 PATCH | Refills: 0 | Status: SHIPPED | OUTPATIENT
Start: 2024-08-12 | End: 2024-08-19

## 2024-08-12 RX ORDER — METHOCARBAMOL 500 MG/1
500 TABLET, FILM COATED ORAL
Status: COMPLETED | OUTPATIENT
Start: 2024-08-12 | End: 2024-08-12

## 2024-08-12 RX ORDER — METHOCARBAMOL 500 MG/1
500 TABLET, FILM COATED ORAL 3 TIMES DAILY
Qty: 15 TABLET | Refills: 0 | Status: SHIPPED | OUTPATIENT
Start: 2024-08-12 | End: 2024-08-17

## 2024-08-12 RX ADMIN — KETOROLAC TROMETHAMINE 10 MG: 10 TABLET, FILM COATED ORAL at 12:08

## 2024-08-12 RX ADMIN — METHOCARBAMOL 500 MG: 500 TABLET ORAL at 12:08

## 2024-08-12 RX ADMIN — LIDOCAINE 1 PATCH: 50 PATCH CUTANEOUS at 12:08

## 2024-08-12 RX ADMIN — OXYCODONE HYDROCHLORIDE AND ACETAMINOPHEN 1 TABLET: 5; 325 TABLET ORAL at 01:08

## 2024-08-12 NOTE — ED PROVIDER NOTES
Encounter Date: 8/12/2024       History     Chief Complaint   Patient presents with    Torticollis     Stiff neck, started 1-2 days ago, worse today      24 yr old male presents to the ER with neck pain. Pt states he woke up yesterday with neck pain especially with movement to the right. Denies rash or fever. Denies injury. No headache or vision changes. Denies taking meds for symptom control. PMH of asthma and anomalous coronary artery communication    The history is provided by the patient. No  was used.     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   All other systems reviewed and are negative.      Physical Exam     Initial Vitals [08/12/24 1218]   BP Pulse Resp Temp SpO2   137/84 88 18 98.6 °F (37 °C) 100 %      MAP       --         Physical Exam    Constitutional: He appears well-developed and well-nourished. He is not diaphoretic. No distress.   HENT:   Head: Normocephalic and atraumatic.   Right Ear: Hearing and tympanic membrane normal.   Left Ear: Hearing and tympanic membrane normal.   Nose: Nose normal.   Mouth/Throat: Uvula is midline, oropharynx is clear and moist and mucous membranes are normal.   Eyes: Lids are normal. Pupils  are equal, round, and reactive to light.   Neck: No tracheal tenderness present. No tracheal deviation present.       Pain only with movement to the right.    Cardiovascular:  Normal rate.           Pulmonary/Chest: Effort normal and breath sounds normal. No respiratory distress. He has no wheezes. He has no rhonchi.   Abdominal: Abdomen is soft. There is no abdominal tenderness.   Musculoskeletal:      Cervical back: No edema or erythema. Muscular tenderness present. No spinous process tenderness. Decreased range of motion.     Neurological: He is oriented to person, place, and time.   Skin: Skin is warm and dry. No rash noted.   Psychiatric: He has a normal mood and affect. His behavior is normal. Judgment and thought content normal.         ED Course   Procedures  Labs Reviewed - No data to display       Imaging Results    None          Medications   methocarbamoL tablet 500 mg (500 mg Oral Given 8/12/24 1231)   ketorolac tablet 10 mg (10 mg Oral Given 8/12/24 1231)   oxyCODONE-acetaminophen 5-325 mg per tablet 1 tablet (1 tablet Oral Given 8/12/24 1334)     Medical Decision Making  Differential Diagnosis includes, but is not limited to:  Fracture, dislocation, compartment syndrome, nerve injury/palsy, vascular injury, DVT, rhabdomyolysis, hemarthrosis, septic joint, cellulitis, bursitis, muscle strain, ligament tear/sprain, laceration, foreign body, abrasion, soft tissue contusion, osteoarthritis.       Risk  Prescription drug management.               ED Course as of 08/12/24 1613   Mon Aug 12, 2024   1316 Pt states he is not feeling much relief with pain meds given. Notified in order to treat with anything stronger, he will need a ride. Calling now. Pt notified this is likely torticollis and will need to rest, meds and a few days to improve. He is to return to the ER for any fever, rash, headache or worsening of condition. Pt is not toxic at present, on phone and after meds can follow up with pcp.  [DT]       ED Course User Index  [DT] Merle Syed NP                           Clinical Impression:  Final diagnoses:  [M43.6] Torticollis (Primary)          ED Disposition Condition    Discharge Stable          ED Prescriptions       Medication Sig Dispense Start Date End Date Auth. Provider    methocarbamoL (ROBAXIN) 500 MG Tab Take 1 tablet (500 mg total) by mouth 3 (three) times daily. for 5 days 15 tablet 8/12/2024 8/17/2024 Merle Syed NP    LIDOcaine (LIDODERM) 5 % Place 1 patch onto the skin once daily. Remove & Discard patch within 12 hours or as directed by MD for 7 days 7 patch 8/12/2024 8/19/2024 Merle Syed NP    ibuprofen (ADVIL,MOTRIN) 800 MG tablet Take 1 tablet (800 mg total) by mouth every 6 (six) hours as needed for Pain. 20 tablet 8/12/2024 -- Merle Syed NP          Follow-up Information       Follow up With Specialties Details Why Contact Info    Neal Lazaro MD Pediatrics Schedule an appointment as soon as possible for a visit in 2 days  [address] [place], [place] [id number]  [address]  [facility]  [place] [id number]  [phone]               Merle Syed NP  08/12/24 1617       Merle Syed NP  08/12/24 1610

## 2024-08-12 NOTE — Clinical Note
"Brenda BAXTERWINTER Baez was seen and treated in our emergency department on 8/12/2024.  He may return to work on 08/14/2024.       If you have any questions or concerns, please don't hesitate to call.      Merle Syed NP"

## 2024-08-12 NOTE — DISCHARGE INSTRUCTIONS
Thank you for letting me care for you today! It was nice meeting you, and I hope you feel better soon. Please come back to [facility] for all of your future medical needs.   Our goal in the ER is to always give you outstanding care and exceptional service. You may receive a survey by mail or email in the next week about your experience in our ED. We would greatly appreciate you completing and returning the survey. Your feedback provides us with a way to recognize our staff who give very good care and it helps us learn how to improve when your experience was below our aspiration of excellence.     Sincerely,     JERRY Keith  Emergency Room Nurse Practitioner  [facility]     Electrodesiccation And Curettage Text: The wound bed was treated with electrodesiccation and curettage after the biopsy was performed. Render Post-Care Instructions In Note?: no X Size Of Lesion In Cm: 0 Dressing: bandage Cryotherapy Text: The wound bed was treated with cryotherapy after the biopsy was performed. Hemostasis: Drysol Electrodesiccation Text: The wound bed was treated with electrodesiccation after the biopsy was performed. Billing Type: [employer] Biopsy Method: Double edge Personna blades Anesthesia Volume In Cc (Will Not Render If 0): 0.6 Post-Care Instructions: I reviewed with the patient in detail post-care instructions. Patient is to keep the biopsy site dry overnight, and then apply bacitracin twice daily until healed. Patient may apply hydrogen peroxide soaks to remove any crusting. Anesthesia Volume In Cc (Will Not Render If 0): 0.5 Notification Instructions: Pt may call office in 1 to 2 weeks for biopsy results.  If treatment is needed, pt will be notified of biopsy results Notification Instructions: Pt may call office in 1 to 2 weeks for biopsy results. If treatment is needed, pt will be notified of biopsy results Wound Care: Polysporin ointment Type Of Destruction Used: Curettage Consent: Written consent was obtained and risks were reviewed including but not limited to scarring, infection, bleeding, scabbing, incomplete removal, nerve damage and allergy to anesthesia. Detail Level: Detailed Billing Type: Third-Party Bill Silver Nitrate Text: The wound bed was treated with silver nitrate after the biopsy was performed. Biopsy Type: H and E Anesthesia Type: 1% lidocaine without epinephrine

## 2024-12-21 ENCOUNTER — HOSPITAL ENCOUNTER (EMERGENCY)
Facility: HOSPITAL | Age: 24
Discharge: HOME | End: 2024-12-21
Attending: STUDENT IN AN ORGANIZED HEALTH CARE EDUCATION/TRAINING PROGRAM

## 2024-12-21 VITALS
OXYGEN SATURATION: 99 % | HEIGHT: 75 IN | BODY MASS INDEX: 23 KG/M2 | HEART RATE: 67 BPM | TEMPERATURE: 98 F | DIASTOLIC BLOOD PRESSURE: 95 MMHG | WEIGHT: 185 LBS | SYSTOLIC BLOOD PRESSURE: 151 MMHG | RESPIRATION RATE: 18 BRPM

## 2024-12-21 DIAGNOSIS — J06.9 VIRAL URI WITH COUGH: Primary | ICD-10-CM

## 2024-12-21 DIAGNOSIS — B00.1 HERPES LABIALIS WITHOUT COMPLICATION: ICD-10-CM

## 2024-12-21 PROCEDURE — 87502 INFLUENZA DNA AMP PROBE: CPT | Mod: ER | Performed by: STUDENT IN AN ORGANIZED HEALTH CARE EDUCATION/TRAINING PROGRAM

## 2024-12-21 PROCEDURE — 87635 SARS-COV-2 COVID-19 AMP PRB: CPT | Mod: ER | Performed by: STUDENT IN AN ORGANIZED HEALTH CARE EDUCATION/TRAINING PROGRAM

## 2024-12-21 PROCEDURE — 99283 EMERGENCY DEPT VISIT LOW MDM: CPT | Mod: ER

## 2024-12-21 RX ORDER — VALACYCLOVIR HYDROCHLORIDE 1 G/1
1000 TABLET, FILM COATED ORAL EVERY 12 HOURS
Qty: 60 TABLET | Refills: 0 | Status: SHIPPED | OUTPATIENT
Start: 2024-12-21 | End: 2025-01-20

## 2024-12-21 NOTE — DISCHARGE INSTRUCTIONS
Thank you for letting me care for you today - it was nice to meet you and I hope you feel better soon. Please follow up with your primary care provider.    I sent in the following medication to your pharmacy:   Fever blister treatment: when you have a fever blister starting take Valtrex - two pills (2 gram) twice daily for one day    Our goal at [facility] is to always give you outstanding care and exceptional service. You may receive a survey about your experience in our ED. We would greatly appreciate you completing and returning the survey. Your feedback provides us with a way to recognize our staff who give very good care and it helps us learn how to improve when your experience.     All the best,     Marysol Clark, MPH, PA-C  Emergency Department Physician Assistant  [facility], [facility], [facility]           Managing Symptoms of Flu and Upper Respiratory Infections (URI) for Adults      You can expect the symptoms of your cold or upper respiratory infection to last 5 to 7 days. A dry hacking cough may continue up to three or four weeks. To help you recover:  Drink more fluids.  Get enough rest.  Use a humidifier or increase time in a steamy shower.      Additional recommendations for managing your symptoms:     Fever, headache, or pain  Acetaminophen (Tylenol)   325mg 2 tablets every 6 hours as needed for the first 5-7 days of infection.   500mg, 2 tablets every 8 hours as needed for the first 5-7 days of infection.  Maximum dose: 3000mg of acetaminophen in 24 hours.  Avoid combination products that contain acetaminophen (read the label) while taking scheduled acetaminophen  Use the lowest effective dose for the shortest possible duration to reduce risk of serious adverse effects.  Avoid acetaminophen if you have liver problems  Ibuprofen (Advil, Motrin) 400 mg every 6 hours-8 hours.  Avoid ibuprofen if you are pregnant, have kidney disease, coronary heart disease, heart failure, or  history of a gastric ulcer or gastric surgery  Maximum dose: 2400 mg of ibuprofen in 24 hours.  Use lowest needed dose for the shortest possible time frame to reduce the risk of serious side effects.  Do not use longer than 7 days, unless directed by your health care provider.  Take with food to prevent getting an upset stomach.  You can rotate between Acetaminophen and Ibuprofen every 3-4 hours. For example, Tylenol at 9am, Ibuprofen at noon, Tylenol at 3pm, etc.   Sinus drainage, sinus/nose/ear congestion  Keep in mind that green or yellow secretions do not equal bacterial infection. It is common to have nasal drainage of various colors with a viral cold or upper respiratory infection. These usually get better with time and do not require antibiotics.  Use over the counter antihistamines allergy medications, like Zyrtec or Claritin, according to directions  Saline sinus rinse - Mix and use according to directions on the product (NeilMed©, XClear©).  Nasal spray (Flonase®, Nasacort®) - 2 sprays per nostril once a day after a saline sinus irrigation.  Sudafed (pseudoephedrine) capsules - Take every 4 to 6 hours per package instructions for sinus congestion.   Available behind the pharmacy counter.   Avoid if you have high blood pressure, heart disease, or take beta blockers (atenolol, metoprolol, etc).   Avoid medications with phenylephrine as an ingredient. Phenylephrine is in many cold/flu medications, but it has been proven to be ineffective.   Sore throat  Take acetaminophen and/or ibuprofen as above.   Use throat lozenges with benzocaine, which help numb your sore throat (Cepacol®, chloraseptic brands).  Gargle with saltwater several times a day to help relieve throat pain. Mix 1/4 teaspoon (1.4 grams) of table salt in 8 ounces (237 milliliters) of warm water. Gargle the solution and then spit it out.   Cough  Avoid coughing too hard or too often. Excessive coughing may cause bronchial (tubes going to the  lungs) irritation which could cause a cough-irritate-cough cycle that can prolong the cough for weeks.  Honey - 1 to 2 teaspoons every 4 to 6 hours as needed.  Cough drops every 4 to 6 hours as needed.  Guaifenesin/dextromethorphan syrup - (Robitussin DM®) per package instructions. Do not take if you are taking an antidepressant, opioid pain medication, sleeping medication, or antipsychotic medication.

## 2024-12-21 NOTE — ED PROVIDER NOTES
"Encounter Date: 12/21/2024       History     Chief Complaint   Patient presents with    Headache     Patient c/o cough, sore throat, cold sore, bodyaches, and headache x 2-3 days.     Patient is a 24 y.o. male who presents with cough, sore throat, bodyaches, and headache x 2-3 days. Patient does reports close contacts with similar symptoms. Patient has taken OTC cold and flu meds for symptom relief.  Patient also says that he has a "fever blister" in his upper lip.  States that he has a history of recurrent "fever blisters".  Says that he has been using Abreva but that the fever blisters not improved.  States that he had a mild sore throat 2 days ago that has resolved.  Patient is tolerating secretions.  No documented history of HSV.  Denies fever, chest pain, shortness of breath, wheezing,  stridor, drooling, NVD, abdominal pain, constipation, urinary problems, joint problems, or any other complaints at this time.      The history is provided by the patient.     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    Asthma      Past Surgical History:   Procedure Laterality Date    anomalous coronary artery repair Right 12/19/2017     Family History   Problem Relation Name Age of Onset    No Known Problems Mother Jennifer     No Known Problems Father Savage     No Known Problems Sister Tracie     No Known Problems Brother Gato     Asthma Sister Maryse     No Known Problems Brother Ronda      Social History     Tobacco Use    Smoking status: Never    Smokeless tobacco: Never   Substance Use Topics    Alcohol use: Never    Drug use: Never     Review of Systems   Constitutional:  Negative for chills and fever.   HENT:  Positive for congestion, mouth sores, postnasal drip, rhinorrhea and sore throat. Negative for ear discharge, ear pain, facial swelling, trouble " swallowing and voice change.    Respiratory:  Positive for cough. Negative for shortness of breath and wheezing.    Cardiovascular:  Negative for chest pain.   Gastrointestinal:  Negative for abdominal distention, abdominal pain, diarrhea, nausea and vomiting.   Genitourinary:  Negative for dysuria, flank pain, frequency and hematuria.   Musculoskeletal:  Positive for myalgias (generalized body aches). Negative for back pain, neck pain and neck stiffness.   Skin:  Negative for rash.   Neurological:  Positive for headaches. Negative for weakness.   Hematological:  Does not bruise/bleed easily.   All other systems reviewed and are negative.      Physical Exam     Initial Vitals [12/21/24 1117]   BP Pulse Resp Temp SpO2   (!) 151/95 67 18 98.2 °F (36.8 °C) 99 %      MAP       --         Physical Exam    Vitals reviewed.  Constitutional: He appears well-developed and well-nourished.   HENT:   Head: Normocephalic and atraumatic.   Nose: Rhinorrhea present. Mouth/Throat: Uvula is midline. No oropharyngeal exudate, posterior oropharyngeal edema, posterior oropharyngeal erythema or tonsillar abscesses.       No exudate or no oropharyngeal lesions/blisters.   Eyes: EOM are normal. Pupils are equal, round, and reactive to light.   Neck:   Normal range of motion.  Cardiovascular:  Normal rate.           Pulmonary/Chest: Breath sounds normal. No respiratory distress. He has no wheezes. He has no rhonchi. He has no rales.   Abdominal: Bowel sounds are normal. He exhibits no distension. There is no abdominal tenderness. There is no rebound.   Musculoskeletal:      Cervical back: Normal range of motion.     Neurological: He is alert and oriented to person, place, and time.         ED Course   Procedures  Labs Reviewed   INFLUENZA A & B BY MOLECULAR       Result Value    Influenza A, Molecular Negative      Influenza B, Molecular Negative      Flu A & B Source Nasal swab     SARS-COV-2 RNA AMPLIFICATION, QUAL    SARS-CoV-2 RNA,  Amplification, Qual Negative            Imaging Results    None          Medications - No data to display  Medical Decision Making  Patient is well-appearing, afebrile 24 y.o. male. VSS and do not suggest sepsis. Denies chest pain, SOB, wheezing, difficulty swallowing, neck pain, neck stiffness. Lung sounds are clear and not consistent with pneumonia. There is no neck pain or limited ROM to suggest retropharyngeal abscess or meningitis. Tolerating PO, non-toxic appearing, no hypoxia. The patient remained comfortable and stable during their visit in the ED.  Details of ED course documented in ED workup.     Differential Diagnosis includes, but is not limited to: COVID, influenza, viral URI, bacterial/viral pharyngitis, pneumonia, PTA, sinusitis, herpes labialis, gingiva stomatitis    All historical, clinical, and laboratory findings reviewed. COVID test negative. Influenza test negative. Patient with constellation of symptoms most consistent with a viral URI.  Lesion on lip appears to be herpetic.  Sent in Rx for Valtrex.  There are no concerning features on physical exam to suggest an emergent or life threatening condition or an invasive bacterial infection, including, but not limited to: pneumonia, bacterial otitis media/externa, sinusitis, pharyngitis, or peritonsillar abscess. No further intervention is indicated at this time. The patient is at low risk for an emergent/life threatening medical condition at this time, and I am of the belief that that it is safe to discharge the patient from the emergency department.     Patient instructed to follow up with PCP in 2-3 days for recheck of today's complaints. Patient has been counseled regarding the need for follow-up as well as the indications to return to the emergency room should new or worrisome developments. Discharge and follow-up instructions discussed with the patient who expressed understanding and willingness to comply with recommendations. Patient  discharged from the emergency department in stable condition, in no acute distress.     Amount and/or Complexity of Data Reviewed  Labs:  Decision-making details documented in ED Course.    Risk  Prescription drug management.               ED Course as of 12/21/24 1610   Sat Dec 21, 2024   1220 Influenza A, Molecular: Negative [OB]   1220 Influenza B, Molecular: Negative [OB]   1220 SARS-CoV-2 RNA, Amplification, Qual: Negative [OB]   1220 Attempted to examine patient. He is not currently in room. Will check in waiting room.  [OB]      ED Course User Index  [OB] Marysol Clark PA-C                           Clinical Impression:  Final diagnoses:  [J06.9] Viral URI with cough (Primary)  [B00.1] Herpes labialis without complication          ED Disposition Condition    Discharge Stable          ED Prescriptions       Medication Sig Dispense Start Date End Date Auth. Provider    valACYclovir (VALTREX) 1000 MG tablet Take 1 tablet (1,000 mg total) by mouth every 12 (twelve) hours. 60 tablet 12/21/2024 1/20/2025 Marysol Clark PA-C          Follow-up Information    None          Marysol Clark PA-C  12/21/24 1610

## 2024-12-21 NOTE — Clinical Note
"Brenda BAXTERWINTER Baez was seen and treated in our emergency department on 12/21/2024.  He may return to work on 12/23/2024.       If you have any questions or concerns, please don't hesitate to call.      Marysol Clark PA-C"

## 2025-03-20 ENCOUNTER — HOSPITAL ENCOUNTER (EMERGENCY)
Facility: HOSPITAL | Age: 25
Discharge: HOME | End: 2025-03-20
Attending: EMERGENCY MEDICINE

## 2025-03-20 VITALS
WEIGHT: 192 LBS | RESPIRATION RATE: 16 BRPM | BODY MASS INDEX: 23.87 KG/M2 | DIASTOLIC BLOOD PRESSURE: 70 MMHG | OXYGEN SATURATION: 100 % | HEIGHT: 75 IN | SYSTOLIC BLOOD PRESSURE: 116 MMHG | HEART RATE: 50 BPM | TEMPERATURE: 98 F

## 2025-03-20 DIAGNOSIS — R19.7 DIARRHEA, UNSPECIFIED TYPE: Primary | ICD-10-CM

## 2025-03-20 PROCEDURE — 63600175 PHARM REV CODE 636 W HCPCS: Mod: ER

## 2025-03-20 PROCEDURE — 96372 THER/PROPH/DIAG INJ SC/IM: CPT

## 2025-03-20 PROCEDURE — 99284 EMERGENCY DEPT VISIT MOD MDM: CPT | Mod: 25,ER

## 2025-03-20 RX ORDER — DICYCLOMINE HYDROCHLORIDE 20 MG/1
20 TABLET ORAL 2 TIMES DAILY
Qty: 20 TABLET | Refills: 0 | Status: SHIPPED | OUTPATIENT
Start: 2025-03-20 | End: 2025-04-19

## 2025-03-20 RX ORDER — DICYCLOMINE HYDROCHLORIDE 10 MG/ML
20 INJECTION INTRAMUSCULAR
Status: COMPLETED | OUTPATIENT
Start: 2025-03-20 | End: 2025-03-20

## 2025-03-20 RX ORDER — ONDANSETRON 4 MG/1
4 TABLET, ORALLY DISINTEGRATING ORAL EVERY 6 HOURS PRN
Qty: 28 TABLET | Refills: 0 | Status: SHIPPED | OUTPATIENT
Start: 2025-03-20

## 2025-03-20 RX ADMIN — DICYCLOMINE HYDROCHLORIDE 20 MG: 10 INJECTION INTRAMUSCULAR at 11:03

## 2025-03-20 NOTE — DISCHARGE INSTRUCTIONS
Today you were seen in the emergency room for diarrhea.  I believe this is likely due to a stomach bug, or viral illness.  This may take 5-7 days to resolve on its own.  However, please take medications as prescribed for your symptoms.  Bentyl will help with stomach cramping and Zofran will help with nausea/vomiting.      Use Gatorade/Pedialyte/coconut water or rehydration packets to help stay hydrated. Vitamin water and plain water do not contain rehydrating electrolytes. Increase clear liquids (water, gatorade, pedialyte, broths, jello, etc) Hold off on solids for 12-18 hours. Then advance to BRAT diet (banana, rice, applesauce, tea, toast/crackers), then advance further as tolerated. Use Peptobismol to help alleviate your diarrhea symptoms. Avoid immodium.     Thank you for allowing me and my emergency team to take care of you here today! I hope you feel better soon. Please do not hesitate to return with any additional concerns that may arise from this or any new problem you encounter.    Our goal in the emergency department is to always give you outstanding care and exceptional service. If you receive a survey by mail or e-mail in the next week regarding your experience in our ED, we would greatly appreciate you completing it. Your feedback provides us with a way to recognize our staff who give very good care and it helps us learn how to improve when your experience was below the excellence we aspire to be!    Brook [place], PA-C [facility], [place], and [place]   Emergency Room Physician Assistant

## 2025-03-20 NOTE — ED PROVIDER NOTES
Encounter Date: 3/20/2025       History     Chief Complaint   Patient presents with    Diarrhea     Pt C/O diarrhea with ABD cramping today.     Patient is a 24-year-old male with a past medical history of asthma who presents to emergency room for intermittent abdominal cramping and diarrhea that onset yesterday.  Patient states that symptoms initially started with a intermittent abdominal cramping.  However, he started with diarrhea as of this morning.  He has had 4 episodes today.  No rectal bleeding.  No fever, body aches, or chills.  No nausea or vomiting.  No recent sick contacts.  No medications taken prior to arrival.    The history is provided by the patient. No  was used.     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   HENT:  Negative for congestion, sore throat and trouble swallowing.    Respiratory:  Negative for cough and shortness of breath.    Cardiovascular:  Negative for chest pain and palpitations.   Gastrointestinal:  Positive for abdominal pain (diffuse cramping) and diarrhea. Negative for blood in stool, constipation, nausea and vomiting.   Genitourinary:  Negative for difficulty urinating, dysuria, frequency and hematuria.   Musculoskeletal:  Negative for back pain and myalgias.   Skin:  Negative for rash and wound.    Neurological:  Negative for weakness, light-headedness, numbness and headaches.       Physical Exam     Initial Vitals [03/20/25 1116]   BP Pulse Resp Temp SpO2   (!) 141/77 65 20 98.1 °F (36.7 °C) 100 %      MAP       --         Physical Exam    Nursing note and vitals reviewed.  Constitutional: He appears well-developed and well-nourished. He is not diaphoretic. No distress.   Patient well-appearing.  Awake and alert.  No acute distress.  Maintaining airway appropriately.  Speaking in complete sentences.   HENT:   Head: Normocephalic and atraumatic.   Right Ear: External ear normal.   Left Ear: External ear normal.   Eyes: Conjunctivae and EOM are normal.   Neck: Neck supple.   Normal range of motion.  Pulmonary/Chest: No respiratory distress.   Abdominal: Abdomen is soft. He exhibits no distension. There is no abdominal tenderness. There is no rebound and no guarding.   Musculoskeletal:         General: No tenderness or edema. Normal range of motion.      Cervical back: Normal range of motion and neck supple.     Neurological: He is alert and oriented to person, place, and time. He has normal strength.   Skin: Skin is warm. Capillary refill takes less than 2 seconds.   Psychiatric: He has a normal mood and affect. His behavior is normal. Thought content normal.         ED Course   Procedures  Labs Reviewed - No data to display       Imaging Results    None          Medications   dicyclomine injection 20 mg (20 mg Intramuscular Given 3/20/25 1132)     Medical Decision Making  Patient presents to emergency room for abdominal cramping and diarrhea.  Vital signs stable and within normal limits.  Physical exam as stated above.    Differential diagnosis includes but is not limited to gastroenteritis, C. difficile, colitis, Crohn's, ulcerative colitis, irritable bowel syndrome, infectious diarrhea (viral or bacterial) drug reaction, stress, among others.  Patient without any recent antibiotic use.  I do not suspect C  diff at this time.  Patient without any urgency or blood in stool.  Low suspicion for Crohn's or ulcerative colitis.  This is more acute in nature.  Patient afebrile and clinically well-appearing.  Unlikely bacterial etiology.  Clinical presentation and physical exam most suggestive of viral gastroenteritis.  Patient was given Bentyl in the emergency room.  Will prescribe Bentyl and Zofran to use upon discharge.  Discussed conservative management such as bland diet and adequate fluid intake.    I see no indication of an emergent process beyond that addressed during our encounter. Patient stable for discharge at this time. I have counseled the patient regarding follow up with primary care and gave strict return precautions. I have discussed the final diagnosis and gave instructions regarding prescribed medications. Patient verbalized understanding and is agreeable.     Problems Addressed:  Diarrhea, unspecified type: acute illness or injury    Amount and/or Complexity of Data Reviewed  External Data Reviewed: notes.     Details: Patient seen for viral gastroenteritis in 03/2022.  Patient states that symptoms are similar to this.  Labs:      Details: Considered ordering lab work.  However, patient afebrile and clinically well-appearing.  Does not meet SIRS criteria.  Diarrhea started today.  Adequate p.o. intake.  Low suspicion for metabolic derangements or dehydration.  Radiology:      Details: The patient appears well-hydrated and is able to tolerate PO in the ED. CT A/P is unlikely to provide additional benefit or outweigh the risks of contrast/radiation.    Risk  OTC drugs.  Prescription drug management.  Risk Details: Comorbidities taken into consideration during the patient's evaluation and treatment include asthma.    Social determinants of health taken into consideration during development of our treatment plan include difficulty in obtaining follow-up, obtaining medications, health literacy, access to healthy  options for preventative/conservative management, and/or support systems due to, but not limited to, transportation limitations, socioeconomic status, and environmental factors.                ED Course as of 03/20/25 1301   u Mar 20, 2025   1226 Patient reports feeling improved after medicine.  Will plan for discharge. [BJ]      ED Course User Index  [BJ] Fely Smith PA-C                           Clinical Impression:  Final diagnoses:  [R19.7] Diarrhea, unspecified type (Primary)          ED Disposition Condition    Discharge Stable          ED Prescriptions       Medication Sig Dispense Start Date End Date Auth. Provider    dicyclomine (BENTYL) 20 mg tablet Take 1 tablet (20 mg total) by mouth 2 (two) times daily. 20 tablet 3/20/2025 4/19/2025 Fely Smith PA-C    ondansetron (ZOFRAN-ODT) 4 MG TbDL Take 1 tablet (4 mg total) by mouth every 6 (six) hours as needed (Nausea). 28 tablet 3/20/2025 -- Fely Smith PA-C          Follow-up Information       Follow up With Specialties Details Why Contact Info    Your doctor  Schedule an appointment as soon as possible for a visit       [facility] - Emergency Dept Emergency Medicine Go to  If new or worsening symptoms occur [address] include [address] beds at the [facility]  Emergency Department  [facility] [id number] [phone]          This note was partially created using [employer] Voice Recognition software. Typographical and content errors may occur with this process. While efforts are made to detect and correct such errors, in some cases errors will persist. For this reason, wording in this document should be considered in the proper context and not strictly verbatim.          [1]   Social History  Tobacco Use    Smoking status: Never    Smokeless tobacco: Never   Substance Use Topics    Alcohol use: Never    Drug use: Never        Fely Smith PA-C  03/20/25 1301

## 2025-03-20 NOTE — Clinical Note
"Brenda BAXTERWINTER Baez was seen and treated in our emergency department on 3/20/2025.  He may return to work on 03/23/2025.       If you have any questions or concerns, please don't hesitate to call.      Fely Smith PA-C"

## (undated) DEVICE — COVER LIGHT HANDLE

## (undated) DEVICE — DRAPE INCISE IOBAN 2 23X17IN

## (undated) DEVICE — SPONGE LAP 18X18 PREWASHED

## (undated) DEVICE — PAD GROUNDING NEONATE 6-30LBS

## (undated) DEVICE — SEE MEDLINE ITEM 146417

## (undated) DEVICE — DRAIN CHANNEL ROUND 15FR

## (undated) DEVICE — BLADE SURGICAL 15C

## (undated) DEVICE — SEE MEDLINE ITEM 154981

## (undated) DEVICE — SEE MEDLINE ITEM 157117

## (undated) DEVICE — DRESSING TRANS 2X2 TEGADERM

## (undated) DEVICE — SPONGE GAUZE 16PLY 4X4

## (undated) DEVICE — CLIP MED TICALL

## (undated) DEVICE — COVER LIGHT HANDLE 80/CA

## (undated) DEVICE — SEE MEDLINE ITEM 146292

## (undated) DEVICE — SPONGE DERMA 8PLY 2X2

## (undated) DEVICE — DRAPE SLUSH WARMER WITH DISC

## (undated) DEVICE — PACK OPEN HEART PEDIATRIC

## (undated) DEVICE — BLADE SAW STERNAL REG

## (undated) DEVICE — PACK PEDIATRIC DRAPE PEELER

## (undated) DEVICE — SEE MEDLINE ITEM 157116

## (undated) DEVICE — CATH ALL PUR URTHL RR 10FR

## (undated) DEVICE — DRAIN CHANNEL ROUND 10FR

## (undated) DEVICE — CLIP LIGACLIP XTRA TITANIUM

## (undated) DEVICE — TRAY FOLEY 16FR INFECTION CONT

## (undated) DEVICE — NDL HYPO A BEVEL 22X1 1/2

## (undated) DEVICE — CATH URETHRAL 16FR RED

## (undated) DEVICE — SUT LIGACLIP SMALL XTRA

## (undated) DEVICE — SEE MEDLINE ITEM 152622

## (undated) DEVICE — SYR 50CC LL

## (undated) DEVICE — CONNECTOR Y 3/8X3/8X3/8

## (undated) DEVICE — DRESSING TRANS 4X4 TEGADERM

## (undated) DEVICE — DRAIN CHEST WATER SEAL